# Patient Record
Sex: FEMALE | Race: WHITE | NOT HISPANIC OR LATINO | Employment: UNEMPLOYED | URBAN - NONMETROPOLITAN AREA
[De-identification: names, ages, dates, MRNs, and addresses within clinical notes are randomized per-mention and may not be internally consistent; named-entity substitution may affect disease eponyms.]

---

## 2023-03-13 DIAGNOSIS — R53.83 OTHER FATIGUE: ICD-10-CM

## 2023-03-13 RX ORDER — MAGNESIUM 64 MG (MAGNESIUM CHLORIDE) TABLET,DELAYED RELEASE
64 DAILY
Qty: 30 TABLET | Refills: 5 | Status: SHIPPED | OUTPATIENT
Start: 2023-03-13 | End: 2023-04-12

## 2023-03-13 RX ORDER — MAGNESIUM 64 MG (MAGNESIUM CHLORIDE) TABLET,DELAYED RELEASE
64 DAILY
COMMUNITY
End: 2023-03-13 | Stop reason: SDUPTHER

## 2023-03-22 DIAGNOSIS — I10 HYPERTENSION, UNSPECIFIED TYPE: Primary | ICD-10-CM

## 2023-03-22 DIAGNOSIS — Z00.00 ENCOUNTER FOR GENERAL ADULT MEDICAL EXAMINATION WITHOUT ABNORMAL FINDINGS: ICD-10-CM

## 2023-03-22 RX ORDER — METOPROLOL TARTRATE 50 MG/1
1 TABLET ORAL DAILY
COMMUNITY
End: 2023-03-22 | Stop reason: SDUPTHER

## 2023-03-23 RX ORDER — METOPROLOL TARTRATE 50 MG/1
50 TABLET ORAL DAILY
Qty: 30 TABLET | Refills: 5 | Status: SHIPPED | OUTPATIENT
Start: 2023-03-23 | End: 2023-04-13 | Stop reason: SDUPTHER

## 2023-03-23 RX ORDER — POTASSIUM CHLORIDE 750 MG/1
CAPSULE, EXTENDED RELEASE ORAL
Qty: 30 CAPSULE | Refills: 5 | Status: SHIPPED | OUTPATIENT
Start: 2023-03-23 | End: 2023-07-18 | Stop reason: SDUPTHER

## 2023-04-03 ENCOUNTER — APPOINTMENT (OUTPATIENT)
Dept: PRIMARY CARE | Facility: CLINIC | Age: 38
End: 2023-04-03
Payer: COMMERCIAL

## 2023-04-12 PROBLEM — R26.9 GAIT DISTURBANCE: Status: ACTIVE | Noted: 2023-04-12

## 2023-04-12 PROBLEM — G40.909 SEIZURE DISORDER (MULTI): Status: ACTIVE | Noted: 2023-04-12

## 2023-04-12 PROBLEM — B00.1 HERPES LABIALIS: Status: ACTIVE | Noted: 2023-04-12

## 2023-04-12 PROBLEM — J45.40 MODERATE PERSISTENT ASTHMA (HHS-HCC): Status: ACTIVE | Noted: 2023-04-12

## 2023-04-12 PROBLEM — E03.8 HYPOTHYROIDISM, SECONDARY: Status: ACTIVE | Noted: 2023-04-12

## 2023-04-12 PROBLEM — F51.01 INSOMNIA, IDIOPATHIC: Status: ACTIVE | Noted: 2023-04-12

## 2023-04-12 PROBLEM — E55.9 VITAMIN D DEFICIENCY: Status: ACTIVE | Noted: 2023-04-12

## 2023-04-12 PROBLEM — I10 HTN (HYPERTENSION), BENIGN: Status: ACTIVE | Noted: 2023-04-12

## 2023-04-12 PROBLEM — R09.82 POST-NASAL DRIP: Status: ACTIVE | Noted: 2023-04-12

## 2023-04-12 PROBLEM — I45.81 PROLONGED QT SYNDROME: Status: ACTIVE | Noted: 2023-04-12

## 2023-04-12 PROBLEM — R53.83 FATIGUE: Status: ACTIVE | Noted: 2023-04-12

## 2023-04-12 PROBLEM — M25.562 LEFT KNEE PAIN: Status: ACTIVE | Noted: 2023-04-12

## 2023-04-12 PROBLEM — G25.81 RESTLESS LEG SYNDROME: Status: ACTIVE | Noted: 2023-04-12

## 2023-04-12 PROBLEM — L30.9 DERMATITIS: Status: ACTIVE | Noted: 2023-04-12

## 2023-04-12 PROBLEM — E83.51 HYPOCALCEMIA: Status: ACTIVE | Noted: 2023-04-12

## 2023-04-12 PROBLEM — F41.9 ANXIETY: Status: ACTIVE | Noted: 2023-04-12

## 2023-04-12 PROBLEM — G47.34 NOCTURNAL HYPOXEMIA: Status: ACTIVE | Noted: 2023-04-12

## 2023-04-12 RX ORDER — FLUTICASONE PROPIONATE 50 MCG
2 SPRAY, SUSPENSION (ML) NASAL DAILY
COMMUNITY
End: 2023-05-30 | Stop reason: SDUPTHER

## 2023-04-12 RX ORDER — LEVOTHYROXINE SODIUM 300 UG/1
300 TABLET ORAL DAILY
COMMUNITY
End: 2023-05-30 | Stop reason: SDUPTHER

## 2023-04-12 RX ORDER — CLOTRIMAZOLE AND BETAMETHASONE DIPROPIONATE 10; .64 MG/G; MG/G
1 CREAM TOPICAL
COMMUNITY
End: 2023-09-29 | Stop reason: SDUPTHER

## 2023-04-12 RX ORDER — ACETAMINOPHEN 500 MG
1 TABLET ORAL DAILY
COMMUNITY
Start: 2021-08-11 | End: 2023-07-18 | Stop reason: SDUPTHER

## 2023-04-12 RX ORDER — ERGOCALCIFEROL 1.25 MG/1
1 CAPSULE ORAL 2 TIMES WEEKLY
COMMUNITY
Start: 2022-06-28 | End: 2023-04-13 | Stop reason: SDUPTHER

## 2023-04-12 RX ORDER — CALCITRIOL 0.5 UG/1
0.5 CAPSULE ORAL DAILY
COMMUNITY
End: 2023-07-18 | Stop reason: SDUPTHER

## 2023-04-13 ENCOUNTER — OFFICE VISIT (OUTPATIENT)
Dept: PRIMARY CARE | Facility: CLINIC | Age: 38
End: 2023-04-13
Payer: COMMERCIAL

## 2023-04-13 VITALS
DIASTOLIC BLOOD PRESSURE: 66 MMHG | HEART RATE: 76 BPM | SYSTOLIC BLOOD PRESSURE: 112 MMHG | HEIGHT: 61 IN | BODY MASS INDEX: 39.65 KG/M2 | WEIGHT: 210 LBS

## 2023-04-13 DIAGNOSIS — R42 DIZZINESS: Primary | ICD-10-CM

## 2023-04-13 DIAGNOSIS — E55.9 VITAMIN D DEFICIENCY: ICD-10-CM

## 2023-04-13 DIAGNOSIS — I10 HYPERTENSION, UNSPECIFIED TYPE: ICD-10-CM

## 2023-04-13 DIAGNOSIS — I45.81 PROLONGED QT SYNDROME: ICD-10-CM

## 2023-04-13 DIAGNOSIS — E66.09 CLASS 2 OBESITY DUE TO EXCESS CALORIES WITHOUT SERIOUS COMORBIDITY WITH BODY MASS INDEX (BMI) OF 39.0 TO 39.9 IN ADULT: ICD-10-CM

## 2023-04-13 DIAGNOSIS — E03.8 HYPOTHYROIDISM, SECONDARY: ICD-10-CM

## 2023-04-13 DIAGNOSIS — I10 HTN (HYPERTENSION), BENIGN: ICD-10-CM

## 2023-04-13 DIAGNOSIS — G40.909 SEIZURE DISORDER (MULTI): ICD-10-CM

## 2023-04-13 PROBLEM — E66.812 CLASS 2 OBESITY DUE TO EXCESS CALORIES WITHOUT SERIOUS COMORBIDITY WITH BODY MASS INDEX (BMI) OF 39.0 TO 39.9 IN ADULT: Status: ACTIVE | Noted: 2023-04-13

## 2023-04-13 PROCEDURE — 99214 OFFICE O/P EST MOD 30 MIN: CPT | Performed by: INTERNAL MEDICINE

## 2023-04-13 PROCEDURE — 3078F DIAST BP <80 MM HG: CPT | Performed by: INTERNAL MEDICINE

## 2023-04-13 PROCEDURE — 3074F SYST BP LT 130 MM HG: CPT | Performed by: INTERNAL MEDICINE

## 2023-04-13 PROCEDURE — 3008F BODY MASS INDEX DOCD: CPT | Performed by: INTERNAL MEDICINE

## 2023-04-13 PROCEDURE — 1036F TOBACCO NON-USER: CPT | Performed by: INTERNAL MEDICINE

## 2023-04-13 RX ORDER — METOPROLOL TARTRATE 50 MG/1
50 TABLET ORAL DAILY
Qty: 90 TABLET | Refills: 3 | Status: SHIPPED | OUTPATIENT
Start: 2023-04-13 | End: 2023-07-18 | Stop reason: ALTCHOICE

## 2023-04-13 RX ORDER — ERGOCALCIFEROL 1.25 MG/1
1 CAPSULE ORAL
Qty: 4 CAPSULE | Refills: 5 | Status: SHIPPED | OUTPATIENT
Start: 2023-04-13 | End: 2023-07-18 | Stop reason: SDUPTHER

## 2023-04-13 ASSESSMENT — ENCOUNTER SYMPTOMS
VOMITING: 0
ARTHRALGIAS: 0
ABDOMINAL PAIN: 0
SHORTNESS OF BREATH: 0
DIARRHEA: 0
WHEEZING: 0
COUGH: 0
BACK PAIN: 0
FATIGUE: 1
NAUSEA: 0
CHEST TIGHTNESS: 0
BLOOD IN STOOL: 0
PALPITATIONS: 1

## 2023-04-13 NOTE — PROGRESS NOTES
Subjective   Patient ID: Bety Ibrahim is a 38 y.o. female who presents for Follow-up (Has c/o dizziness x 3-4 days but denies any currently ).  HPI  She is here today for a checkup and explains that several days ago she was having some symptoms of dizziness which she explains feels similar to what she had experienced in the past with her heart issues.  She states it has resolved now and she is feeling okay.  She has been following with Dr. Tyler Chou at [facility] for many years.  She was diagnosed with DiGeorge syndrome and had to have an AICD placed back in June 1999.  It was removed in 2019.  She states she had a recent follow-up and things seem to be well at that time but now she is having some different symptoms.  She would like to see cardiology closer to home and I will be referring her to  cardiology.  I am also going to order a 2-week cardiac monitor.  She states she has been drinking a lot of pop and I have asked that she cut out the soda because sometimes it can drive down the sodium level and so forth.  We have decided to do some new lab work to make sure all is well since she has had a history of electrolyte disturbances and vitamin D deficiency.  We plan on seeing her back in follow-up and as always I do encourage her to call if she is not doing well and go to the emergency room if she has severe symptoms  Review of Systems   Constitutional:  Positive for fatigue.   Respiratory:  Negative for cough, chest tightness, shortness of breath and wheezing.    Cardiovascular:  Positive for palpitations. Negative for chest pain and leg swelling.   Gastrointestinal:  Negative for abdominal pain, blood in stool, diarrhea, nausea and vomiting.   Musculoskeletal:  Negative for arthralgias and back pain.     Objective   Physical Exam  Vitals and nursing note reviewed.   Constitutional:       General: She is not in acute distress.     Appearance: Normal appearance.   HENT:      Head:  Normocephalic and atraumatic.   Eyes:      Conjunctiva/sclera: Conjunctivae normal.   Cardiovascular:      Rate and Rhythm: Normal rate and regular rhythm.      Heart sounds: Normal heart sounds.   Pulmonary:      Effort: No respiratory distress.      Breath sounds: No wheezing.   Abdominal:      Palpations: Abdomen is soft.      Tenderness: There is no abdominal tenderness. There is no guarding.   Musculoskeletal:         General: No swelling. Normal range of motion.   Skin:     General: Skin is warm and dry.   Neurological:      General: No focal deficit present.      Mental Status: She is alert and oriented to person, place, and time.   Psychiatric:         Behavior: Behavior normal.       Assessment/Plan   Problem List Items Addressed This Visit          Nervous    Hypothyroidism, secondary     - We will check a TSH given current symptoms  -She will continue with levothyroxine 300 mcg daily         Relevant Orders    TSH    Seizure disorder (CMS/HCC)     -Her seizure disorder has been stable  -She does not drive as a result of her condition  -Her medication for seizure disorder was discontinued many years ago         Relevant Orders    CBC and Auto Differential       Circulatory    HTN (hypertension), benign     -Blood pressure is currently well controlled  -She will remain on metoprolol tartrate 50 mg daily         Prolonged QT syndrome     -We are  referring her to cardiology         Relevant Medications    metoprolol tartrate (Lopressor) 50 mg tablet    Other Relevant Orders    Referral to Cardiology       Endocrine/Metabolic    Vitamin D deficiency     -We will check a vitamin D level         Class 2 obesity due to excess calories without serious comorbidity with body mass index (BMI) of 39.0 to 39.9 in adult     -I am sending her for a fasting glucose level and hemoglobin A1c  -I encouraged her to eat a healthy diet and try to stay as physically active as possible  -I recommended that she start by not  drinking soda beverages and simply stick with water         Relevant Orders    Hemoglobin A1c       Other    Dizziness - Primary     -Her dizziness has resolved as of a week ago  -I am asking her to get lab work done at her earliest convenience and we will see her back to go over results         Relevant Orders    Referral to Cardiology    Holter or Event Cardiac Monitor    Comprehensive Metabolic Panel    CBC and Auto Differential     Other Visit Diagnoses       Hypertension, unspecified type        Relevant Medications    metoprolol tartrate (Lopressor) 50 mg tablet               Mary Rosenberg,

## 2023-04-13 NOTE — ASSESSMENT & PLAN NOTE
-Her dizziness has resolved as of a week ago  -I am asking her to get lab work done at her earliest convenience and we will see her back to go over results

## 2023-04-13 NOTE — PATIENT INSTRUCTIONS
Please remember to go get lab work fasting at your earliest convenience and we will go over the results when you come back  We are also going to schedule you for a cardiac monitor which we will wear for 2 weeks and then you should see the cardiologist after it is completed  I would also like to see you back after the monitor is completed

## 2023-04-13 NOTE — ASSESSMENT & PLAN NOTE
-She is being referred to  cardiology team for evaluation of her heart  -She had a cardiac defibrillator implanted in June 1999 and it was removed in 2019  -She has been following with Dr. Tyler Chou a pediatric cardiologist at [facility].  -I am ordering a 2-week cardiac monitor and we discussed symptoms for which to go to the emergency room

## 2023-04-13 NOTE — ASSESSMENT & PLAN NOTE
-I am sending her for a fasting glucose level and hemoglobin A1c  -I encouraged her to eat a healthy diet and try to stay as physically active as possible  -I recommended that she start by not drinking soda beverages and simply stick with water

## 2023-04-13 NOTE — ASSESSMENT & PLAN NOTE
-Her seizure disorder has been stable  -She does not drive as a result of her condition  -Her medication for seizure disorder was discontinued many years ago

## 2023-04-28 ENCOUNTER — LAB (OUTPATIENT)
Dept: LAB | Facility: LAB | Age: 38
End: 2023-04-28
Payer: COMMERCIAL

## 2023-04-28 DIAGNOSIS — G40.909 SEIZURE DISORDER (MULTI): ICD-10-CM

## 2023-04-28 DIAGNOSIS — R42 DIZZINESS: ICD-10-CM

## 2023-04-28 DIAGNOSIS — E03.8 HYPOTHYROIDISM, SECONDARY: ICD-10-CM

## 2023-04-28 DIAGNOSIS — E66.09 CLASS 2 OBESITY DUE TO EXCESS CALORIES WITHOUT SERIOUS COMORBIDITY WITH BODY MASS INDEX (BMI) OF 39.0 TO 39.9 IN ADULT: ICD-10-CM

## 2023-04-28 LAB
ALANINE AMINOTRANSFERASE (SGPT) (U/L) IN SER/PLAS: 24 U/L (ref 7–45)
ALBUMIN (G/DL) IN SER/PLAS: 4.3 G/DL (ref 3.4–5)
ALKALINE PHOSPHATASE (U/L) IN SER/PLAS: 63 U/L (ref 33–110)
ANION GAP IN SER/PLAS: 13 MMOL/L (ref 10–20)
ASPARTATE AMINOTRANSFERASE (SGOT) (U/L) IN SER/PLAS: 20 U/L (ref 9–39)
BASOPHILS (10*3/UL) IN BLOOD BY AUTOMATED COUNT: 0.05 X10E9/L (ref 0–0.1)
BASOPHILS/100 LEUKOCYTES IN BLOOD BY AUTOMATED COUNT: 0.5 % (ref 0–2)
BILIRUBIN TOTAL (MG/DL) IN SER/PLAS: 0.4 MG/DL (ref 0–1.2)
CALCIUM (MG/DL) IN SER/PLAS: 7.6 MG/DL (ref 8.6–10.3)
CARBON DIOXIDE, TOTAL (MMOL/L) IN SER/PLAS: 26 MMOL/L (ref 21–32)
CHLORIDE (MMOL/L) IN SER/PLAS: 101 MMOL/L (ref 98–107)
CREATININE (MG/DL) IN SER/PLAS: 0.91 MG/DL (ref 0.5–1.05)
EOSINOPHILS (10*3/UL) IN BLOOD BY AUTOMATED COUNT: 0.45 X10E9/L (ref 0–0.7)
EOSINOPHILS/100 LEUKOCYTES IN BLOOD BY AUTOMATED COUNT: 4.2 % (ref 0–6)
ERYTHROCYTE DISTRIBUTION WIDTH (RATIO) BY AUTOMATED COUNT: 13.2 % (ref 11.5–14.5)
ERYTHROCYTE MEAN CORPUSCULAR HEMOGLOBIN CONCENTRATION (G/DL) BY AUTOMATED: 32.5 G/DL (ref 32–36)
ERYTHROCYTE MEAN CORPUSCULAR VOLUME (FL) BY AUTOMATED COUNT: 88 FL (ref 80–100)
ERYTHROCYTES (10*6/UL) IN BLOOD BY AUTOMATED COUNT: 4.27 X10E12/L (ref 4–5.2)
GFR FEMALE: 82 ML/MIN/1.73M2
GLUCOSE (MG/DL) IN SER/PLAS: 79 MG/DL (ref 74–99)
HEMATOCRIT (%) IN BLOOD BY AUTOMATED COUNT: 37.5 % (ref 36–46)
HEMOGLOBIN (G/DL) IN BLOOD: 12.2 G/DL (ref 12–16)
IMMATURE GRANULOCYTES/100 LEUKOCYTES IN BLOOD BY AUTOMATED COUNT: 0.5 % (ref 0–0.9)
LEUKOCYTES (10*3/UL) IN BLOOD BY AUTOMATED COUNT: 10.7 X10E9/L (ref 4.4–11.3)
LYMPHOCYTES (10*3/UL) IN BLOOD BY AUTOMATED COUNT: 2.8 X10E9/L (ref 1.2–4.8)
LYMPHOCYTES/100 LEUKOCYTES IN BLOOD BY AUTOMATED COUNT: 26.1 % (ref 13–44)
MONOCYTES (10*3/UL) IN BLOOD BY AUTOMATED COUNT: 0.68 X10E9/L (ref 0.1–1)
MONOCYTES/100 LEUKOCYTES IN BLOOD BY AUTOMATED COUNT: 6.3 % (ref 2–10)
NEUTROPHILS (10*3/UL) IN BLOOD BY AUTOMATED COUNT: 6.71 X10E9/L (ref 1.2–7.7)
NEUTROPHILS/100 LEUKOCYTES IN BLOOD BY AUTOMATED COUNT: 62.4 % (ref 40–80)
PLATELETS (10*3/UL) IN BLOOD AUTOMATED COUNT: 214 X10E9/L (ref 150–450)
POTASSIUM (MMOL/L) IN SER/PLAS: 3.8 MMOL/L (ref 3.5–5.3)
PROTEIN TOTAL: 8 G/DL (ref 6.4–8.2)
SODIUM (MMOL/L) IN SER/PLAS: 136 MMOL/L (ref 136–145)
THYROTROPIN (MIU/L) IN SER/PLAS BY DETECTION LIMIT <= 0.05 MIU/L: 12.63 MIU/L (ref 0.44–3.98)
UREA NITROGEN (MG/DL) IN SER/PLAS: 12 MG/DL (ref 6–23)

## 2023-04-28 PROCEDURE — 83036 HEMOGLOBIN GLYCOSYLATED A1C: CPT

## 2023-04-28 PROCEDURE — 36415 COLL VENOUS BLD VENIPUNCTURE: CPT

## 2023-04-28 PROCEDURE — 80053 COMPREHEN METABOLIC PANEL: CPT

## 2023-04-28 PROCEDURE — 84443 ASSAY THYROID STIM HORMONE: CPT

## 2023-04-28 PROCEDURE — 85025 COMPLETE CBC W/AUTO DIFF WBC: CPT

## 2023-04-29 LAB
ESTIMATED AVERAGE GLUCOSE FOR HBA1C: 108 MG/DL
HEMOGLOBIN A1C/HEMOGLOBIN TOTAL IN BLOOD: 5.4 %

## 2023-05-30 ENCOUNTER — OFFICE VISIT (OUTPATIENT)
Dept: PRIMARY CARE | Facility: CLINIC | Age: 38
End: 2023-05-30
Payer: COMMERCIAL

## 2023-05-30 VITALS
HEIGHT: 61 IN | WEIGHT: 204 LBS | SYSTOLIC BLOOD PRESSURE: 120 MMHG | OXYGEN SATURATION: 96 % | HEART RATE: 74 BPM | DIASTOLIC BLOOD PRESSURE: 70 MMHG | BODY MASS INDEX: 38.51 KG/M2

## 2023-05-30 DIAGNOSIS — E03.8 HYPOTHYROIDISM, SECONDARY: ICD-10-CM

## 2023-05-30 DIAGNOSIS — I45.81 PROLONGED QT SYNDROME: ICD-10-CM

## 2023-05-30 DIAGNOSIS — R09.82 POST-NASAL DRIP: ICD-10-CM

## 2023-05-30 DIAGNOSIS — I10 HTN (HYPERTENSION), BENIGN: ICD-10-CM

## 2023-05-30 DIAGNOSIS — D82.1 DIGEORGE'S SYNDROME (MULTI): ICD-10-CM

## 2023-05-30 DIAGNOSIS — R05.1 ACUTE COUGH: Primary | ICD-10-CM

## 2023-05-30 DIAGNOSIS — I27.21 SECONDARY PULMONARY ARTERIAL HYPERTENSION (MULTI): ICD-10-CM

## 2023-05-30 PROBLEM — F81.9 LEARNING DISABILITY: Status: ACTIVE | Noted: 2021-12-10

## 2023-05-30 PROBLEM — E03.4 HYPOTHYROIDISM DUE TO ACQUIRED ATROPHY OF THYROID: Status: ACTIVE | Noted: 2021-12-10

## 2023-05-30 PROBLEM — Z95.810 CARDIAC DEFIBRILLATOR IN PLACE: Status: ACTIVE | Noted: 2023-05-30

## 2023-05-30 PROBLEM — H69.93 DYSFUNCTION OF BOTH EUSTACHIAN TUBES: Status: ACTIVE | Noted: 2023-05-30

## 2023-05-30 PROCEDURE — 99214 OFFICE O/P EST MOD 30 MIN: CPT | Performed by: INTERNAL MEDICINE

## 2023-05-30 PROCEDURE — 3074F SYST BP LT 130 MM HG: CPT | Performed by: INTERNAL MEDICINE

## 2023-05-30 PROCEDURE — 1036F TOBACCO NON-USER: CPT | Performed by: INTERNAL MEDICINE

## 2023-05-30 PROCEDURE — 3008F BODY MASS INDEX DOCD: CPT | Performed by: INTERNAL MEDICINE

## 2023-05-30 PROCEDURE — 3078F DIAST BP <80 MM HG: CPT | Performed by: INTERNAL MEDICINE

## 2023-05-30 RX ORDER — MAGNESIUM 64 MG (MAGNESIUM CHLORIDE) TABLET,DELAYED RELEASE
1 DAILY
COMMUNITY
End: 2023-09-29 | Stop reason: SDUPTHER

## 2023-05-30 RX ORDER — METOPROLOL SUCCINATE 50 MG/1
50 TABLET, EXTENDED RELEASE ORAL DAILY
Qty: 90 TABLET | Refills: 0 | Status: SHIPPED | OUTPATIENT
Start: 2023-05-30 | End: 2023-07-18 | Stop reason: SDUPTHER

## 2023-05-30 RX ORDER — METOPROLOL SUCCINATE 50 MG/1
50 TABLET, EXTENDED RELEASE ORAL DAILY
Qty: 30 TABLET | Refills: 5 | Status: SHIPPED | OUTPATIENT
Start: 2023-05-30 | End: 2023-05-30 | Stop reason: SDUPTHER

## 2023-05-30 RX ORDER — LEVOTHYROXINE SODIUM 300 UG/1
TABLET ORAL
Qty: 90 TABLET | Refills: 1 | Status: SHIPPED | OUTPATIENT
Start: 2023-05-30 | End: 2023-07-18 | Stop reason: SDUPTHER

## 2023-05-30 RX ORDER — FLUTICASONE PROPIONATE 50 MCG
2 SPRAY, SUSPENSION (ML) NASAL DAILY
Qty: 16 G | Refills: 3 | Status: SHIPPED | OUTPATIENT
Start: 2023-05-30 | End: 2023-09-29 | Stop reason: SDUPTHER

## 2023-05-30 RX ORDER — BENZONATATE 100 MG/1
100 CAPSULE ORAL 3 TIMES DAILY PRN
Qty: 42 CAPSULE | Refills: 0 | Status: SHIPPED | OUTPATIENT
Start: 2023-05-30 | End: 2023-06-29

## 2023-05-30 RX ORDER — LEVOTHYROXINE SODIUM 75 UG/1
TABLET ORAL
Qty: 90 TABLET | Refills: 1 | Status: SHIPPED | OUTPATIENT
Start: 2023-05-30 | End: 2023-07-18 | Stop reason: SDUPTHER

## 2023-05-30 ASSESSMENT — ENCOUNTER SYMPTOMS
COUGH: 1
BLOOD IN STOOL: 0
ABDOMINAL PAIN: 0
NAUSEA: 0
WHEEZING: 0
VOMITING: 0
PALPITATIONS: 0
FATIGUE: 1
CHEST TIGHTNESS: 0
SHORTNESS OF BREATH: 0
DIARRHEA: 0
BACK PAIN: 0
ARTHRALGIAS: 0

## 2023-05-30 NOTE — PATIENT INSTRUCTIONS
As we discussed your thyroid blood test results are improving after you been taking your levothyroxine more consistently every day.  As you are aware you are on the highest dose of levothyroxine which is 300 mcg.  I am going to add a second tablet to the 300 mcg.  Basically you will get a 300 mcg dose and a 75 mcg dose that you will take together each day.  That means you will be taking 375 mcg every day.  Please remember to take it consistently every day and in isolation from food, other medications, or vitamins.  In approximately 2 months from now you will go to the lab for a TSH and then I will see back to go over the results  I went ahead and refilled your metoprolol but you will see that you are getting metoprolol succinate which is an extended release preparation.  You will still be on 50 mg and you will take it each day in the morning once daily.  Please discard your other metoprolol medication and please call with any problems or concerns  Be sure and discussed your metoprolol dose with the cardiologist when you see them next time  I went ahead and refilled your Flonase nasal spray and also I sent medication for cough called Tessalon.  This medication is to be swallowed whole with water and not to don or sucked on.  Please continue following with endocrinology  Again I want to see you back in 2 months but please call with any questions or concerns sooner

## 2023-05-30 NOTE — ASSESSMENT & PLAN NOTE
-Blood pressure is currently well controlled.  -She went on to explain that she has been taking her metoprolol 50 mg every day for several years and she forgot to ask for refill when she saw the cardiologist recently.  Oddly she has been getting metoprolol tartrate and only taking 50 mg once a day.  I told her that this medication is typically dosed twice a day because of its half-life.  I am switching her to metoprolol succinate XL 50 mg once a day.  -She will call with any problems regarding this medication

## 2023-05-30 NOTE — ASSESSMENT & PLAN NOTE
-She has been referred to an EP specialist in the  system  -She had a recent cardiac monitor and I could not find the results at this time

## 2023-05-30 NOTE — ASSESSMENT & PLAN NOTE
-TSH came back at 12.63.  -She has required unusually high doses of levothyroxine for supplementation.  She has been on 300 mcg for couple of months.  I am adding 75 mcg to her regimen and she has agreed to go to the lab in approximately 2 months for another TSH.  I will see her back at that time to make sure we are on track.

## 2023-05-30 NOTE — PROGRESS NOTES
Subjective   Patient ID: Bety Ibrahim is a 38 y.o. female who presents for No chief complaint on file..  HPI  She is here today for follow-up and since her last visit she saw Dr. Stout on May 22.  He reviewed her medical history and cardiac condition and has referred her to an electrophysiologist.  She also completed a cardiac monitor for a few days shy of 2 weeks because she states she ran out of the sticky pads for her chest.  I did not see the results of that monitor yet.  We did conduct a review of systems and she states that overall she has been feeling okay.  She has however had some symptoms and postnasal drip with cough and it appears that it is likely allergy.  Giving her a refill on her Flonase and also Tessalon for her cough.  She will call if her condition is worsening as opposed to getting better.  We also reviewed her most recent laboratory test results and her TSH is improving from last time.  It appears that she still needs additional thyroid supplementation.  She reports that she takes a 300 mcg consistently and has not missed any doses.  We will add 75 to her regimen and then recheck a TSH again in 2 months.  She also states that she ran out of her metoprolol and she has been taking 50 mg once a day for many years.  I did agree to give her a short-term follow-up prescription and then we talked about making sure she checks with cardiology next time regarding continuation of this medicine.  We also reviewed her other laboratory test results and for the most part and pleased with all of her numbers.  I do want her to follow-up with her endocrinologist however because of persistent low calcium levels and so forth.  We did conduct a review of systems and I will summarize my assessment and plan in a problem based format  Review of Systems   Constitutional:  Positive for fatigue.   HENT:  Positive for congestion and postnasal drip.    Respiratory:  Positive for cough. Negative for chest tightness,  shortness of breath and wheezing.    Cardiovascular:  Negative for chest pain, palpitations and leg swelling.   Gastrointestinal:  Negative for abdominal pain, blood in stool, diarrhea, nausea and vomiting.   Musculoskeletal:  Negative for arthralgias and back pain.     Objective   Physical Exam  Vitals and nursing note reviewed.   Constitutional:       General: She is not in acute distress.     Appearance: Normal appearance.   HENT:      Head: Normocephalic and atraumatic.   Eyes:      Conjunctiva/sclera: Conjunctivae normal.   Cardiovascular:      Rate and Rhythm: Normal rate and regular rhythm.      Heart sounds: Normal heart sounds.   Pulmonary:      Effort: No respiratory distress.      Breath sounds: No wheezing.   Abdominal:      Palpations: Abdomen is soft.      Tenderness: There is no abdominal tenderness. There is no guarding.   Musculoskeletal:         General: No swelling. Normal range of motion.   Skin:     General: Skin is warm and dry.   Neurological:      General: No focal deficit present.      Mental Status: She is alert and oriented to person, place, and time.   Psychiatric:         Behavior: Behavior normal.       Recent Results (from the past 1344 hour(s))   Hemoglobin A1c    Collection Time: 04/28/23  2:20 PM   Result Value Ref Range    Hemoglobin A1C 5.4 %    Estimated Average Glucose 108 MG/DL   CBC and Auto Differential    Collection Time: 04/28/23  2:20 PM   Result Value Ref Range    WBC 10.7 4.4 - 11.3 x10E9/L    RBC 4.27 4.00 - 5.20 x10E12/L    Hemoglobin 12.2 12.0 - 16.0 g/dL    Hematocrit 37.5 36.0 - 46.0 %    MCV 88 80 - 100 fL    MCHC 32.5 32.0 - 36.0 g/dL    Platelets 214 150 - 450 x10E9/L    RDW 13.2 11.5 - 14.5 %    Neutrophils % 62.4 40.0 - 80.0 %    Immature Granulocytes %, Automated 0.5 0.0 - 0.9 %    Lymphocytes % 26.1 13.0 - 44.0 %    Monocytes % 6.3 2.0 - 10.0 %    Eosinophils % 4.2 0.0 - 6.0 %    Basophils % 0.5 0.0 - 2.0 %    Neutrophils Absolute 6.71 1.20 - 7.70 x10E9/L     Lymphocytes Absolute 2.80 1.20 - 4.80 x10E9/L    Monocytes Absolute 0.68 0.10 - 1.00 x10E9/L    Eosinophils Absolute 0.45 0.00 - 0.70 x10E9/L    Basophils Absolute 0.05 0.00 - 0.10 x10E9/L   Comprehensive Metabolic Panel    Collection Time: 04/28/23  2:20 PM   Result Value Ref Range    Glucose 79 74 - 99 mg/dL    Sodium 136 136 - 145 mmol/L    Potassium 3.8 3.5 - 5.3 mmol/L    Chloride 101 98 - 107 mmol/L    Bicarbonate 26 21 - 32 mmol/L    Anion Gap 13 10 - 20 mmol/L    Urea Nitrogen 12 6 - 23 mg/dL    Creatinine 0.91 0.50 - 1.05 mg/dL    GFR Female 82 >90 mL/min/1.73m2    Calcium 7.6 (L) 8.6 - 10.3 mg/dL    Albumin 4.3 3.4 - 5.0 g/dL    Alkaline Phosphatase 63 33 - 110 U/L    Total Protein 8.0 6.4 - 8.2 g/dL    AST 20 9 - 39 U/L    Total Bilirubin 0.4 0.0 - 1.2 mg/dL    ALT (SGPT) 24 7 - 45 U/L   TSH    Collection Time: 04/28/23  2:20 PM   Result Value Ref Range    TSH 12.63 (H) 0.44 - 3.98 mIU/L       Assessment/Plan   Problem List Items Addressed This Visit          Nervous    Hypothyroidism, secondary     -TSH came back at 12.63.  -She has required unusually high doses of levothyroxine for supplementation.  She has been on 300 mcg for couple of months.  I am adding 75 mcg to her regimen and she has agreed to go to the lab in approximately 2 months for another TSH.  I will see her back at that time to make sure we are on track.         Relevant Medications    levothyroxine (Synthroid, Unithroid) 300 mcg tablet    levothyroxine (Synthroid, Levoxyl) 75 mcg tablet    Other Relevant Orders    TSH    Follow Up In Primary Care       Circulatory    HTN (hypertension), benign     -Blood pressure is currently well controlled.  -She went on to explain that she has been taking her metoprolol 50 mg every day for several years and she forgot to ask for refill when she saw the cardiologist recently.  Oddly she has been getting metoprolol tartrate and only taking 50 mg once a day.  I told her that this medication is typically  dosed twice a day because of its half-life.  I am switching her to metoprolol succinate XL 50 mg once a day.  -She will call with any problems regarding this medication         Relevant Medications    metoprolol succinate XL (Toprol-XL) 50 mg 24 hr tablet    Prolonged QT syndrome     -She has been referred to an EP specialist in the  system  -She had a recent cardiac monitor and I could not find the results at this time         Relevant Medications    metoprolol succinate XL (Toprol-XL) 50 mg 24 hr tablet    Secondary pulmonary arterial hypertension (CMS/HCC)     -She is following with cardiology            Other    Post-nasal drip     -I am providing a refill on her Flonase nasal spray for her postnasal drip and I also agreed to give her some Tessalon for her cough         Relevant Medications    fluticasone (Flonase) 50 mcg/actuation nasal spray    DiGeorge's syndrome (CMS/HCC)     -Stable  -She will continue with cardiology          Other Visit Diagnoses       Acute cough    -  Primary    Relevant Medications    benzonatate (Tessalon) 100 mg capsule               Mary Rosenberg,

## 2023-05-30 NOTE — ASSESSMENT & PLAN NOTE
-I am providing a refill on her Flonase nasal spray for her postnasal drip and I also agreed to give her some Tessalon for her cough

## 2023-07-18 ENCOUNTER — OFFICE VISIT (OUTPATIENT)
Dept: PRIMARY CARE | Facility: CLINIC | Age: 38
End: 2023-07-18
Payer: COMMERCIAL

## 2023-07-18 VITALS
HEART RATE: 97 BPM | WEIGHT: 216 LBS | BODY MASS INDEX: 40.78 KG/M2 | HEIGHT: 61 IN | OXYGEN SATURATION: 97 % | SYSTOLIC BLOOD PRESSURE: 126 MMHG | DIASTOLIC BLOOD PRESSURE: 70 MMHG

## 2023-07-18 DIAGNOSIS — E03.8 HYPOTHYROIDISM, SECONDARY: ICD-10-CM

## 2023-07-18 DIAGNOSIS — E55.9 VITAMIN D DEFICIENCY: ICD-10-CM

## 2023-07-18 DIAGNOSIS — I10 HTN (HYPERTENSION), BENIGN: ICD-10-CM

## 2023-07-18 DIAGNOSIS — M20.001 FINGER DEFORMITY, RIGHT: Primary | ICD-10-CM

## 2023-07-18 DIAGNOSIS — Z00.00 ENCOUNTER FOR GENERAL ADULT MEDICAL EXAMINATION WITHOUT ABNORMAL FINDINGS: ICD-10-CM

## 2023-07-18 PROCEDURE — 99214 OFFICE O/P EST MOD 30 MIN: CPT | Performed by: INTERNAL MEDICINE

## 2023-07-18 PROCEDURE — 3074F SYST BP LT 130 MM HG: CPT | Performed by: INTERNAL MEDICINE

## 2023-07-18 PROCEDURE — 1036F TOBACCO NON-USER: CPT | Performed by: INTERNAL MEDICINE

## 2023-07-18 PROCEDURE — 3078F DIAST BP <80 MM HG: CPT | Performed by: INTERNAL MEDICINE

## 2023-07-18 PROCEDURE — 3008F BODY MASS INDEX DOCD: CPT | Performed by: INTERNAL MEDICINE

## 2023-07-18 RX ORDER — ACETAMINOPHEN 500 MG
1 TABLET ORAL DAILY
Qty: 90 TABLET | Refills: 1 | Status: SHIPPED | OUTPATIENT
Start: 2023-07-18 | End: 2023-09-29 | Stop reason: SDUPTHER

## 2023-07-18 RX ORDER — LEVOTHYROXINE SODIUM 75 UG/1
TABLET ORAL
Qty: 90 TABLET | Refills: 1 | Status: SHIPPED | OUTPATIENT
Start: 2023-07-18 | End: 2024-01-10 | Stop reason: SDUPTHER

## 2023-07-18 RX ORDER — ERGOCALCIFEROL 1.25 MG/1
1 CAPSULE ORAL
Qty: 4 CAPSULE | Refills: 5 | Status: SHIPPED | OUTPATIENT
Start: 2023-07-18 | End: 2023-09-29 | Stop reason: SDUPTHER

## 2023-07-18 RX ORDER — METOPROLOL SUCCINATE 50 MG/1
50 TABLET, EXTENDED RELEASE ORAL DAILY
Qty: 90 TABLET | Refills: 1 | Status: SHIPPED | OUTPATIENT
Start: 2023-07-18 | End: 2023-09-29 | Stop reason: SDUPTHER

## 2023-07-18 RX ORDER — LEVOTHYROXINE SODIUM 300 UG/1
TABLET ORAL
Qty: 90 TABLET | Refills: 1 | Status: SHIPPED | OUTPATIENT
Start: 2023-07-18 | End: 2024-01-10 | Stop reason: SDUPTHER

## 2023-07-18 RX ORDER — CHOLECALCIFEROL (VITAMIN D3) 50 MCG
2000 TABLET ORAL DAILY
COMMUNITY
Start: 2023-06-11 | End: 2023-07-18 | Stop reason: ALTCHOICE

## 2023-07-18 RX ORDER — POTASSIUM CHLORIDE 750 MG/1
10 CAPSULE, EXTENDED RELEASE ORAL DAILY
Qty: 90 CAPSULE | Refills: 1 | Status: SHIPPED | OUTPATIENT
Start: 2023-07-18 | End: 2023-09-29 | Stop reason: SDUPTHER

## 2023-07-18 RX ORDER — CALCITRIOL 0.5 UG/1
0.5 CAPSULE ORAL DAILY
Qty: 30 CAPSULE | Refills: 5 | Status: SHIPPED | OUTPATIENT
Start: 2023-07-18 | End: 2023-09-29 | Stop reason: SDUPTHER

## 2023-07-18 NOTE — ASSESSMENT & PLAN NOTE
-She will go for a TSH ASAP and have agreed to call her with results  -It is that time that we will decide if we need to make an adjustment in her levothyroxine dose  -She is currently taking the equivalent of 375 mcg a day

## 2023-07-18 NOTE — PROGRESS NOTES
Subjective   Patient ID: Bety Ibrahim is a 38 y.o. female who presents for No chief complaint on file..  HPI  She is here today for follow-up.  We had intended for her to get a thyroid blood test prior to today's visit but she forgot.  She has promised to get it later this week and when it comes back we will call her with results.  We have been adjusting her thyroid medication and wanting to get her thyroid blood work within acceptable range.  She also points to a bump on the left middle finger distally and medially.  She states has been there for probably couple of months.  It is slightly painful.  We will do an x-ray and I have agreed to call her with results.  It does not look like there is an infection but rather may be a reaction to some chronic irritation.  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  Overall she is doing well with her blood pressure as we had made changes in her metoprolol and she is doing well overall with the medicine.  Review of Systems  Objective   Physical Exam  Vitals and nursing note reviewed.   Constitutional:       General: She is not in acute distress.     Appearance: Normal appearance.   HENT:      Head: Normocephalic and atraumatic.   Eyes:      Conjunctiva/sclera: Conjunctivae normal.   Cardiovascular:      Rate and Rhythm: Normal rate and regular rhythm.      Heart sounds: Normal heart sounds.   Pulmonary:      Effort: No respiratory distress.      Breath sounds: No wheezing.   Abdominal:      Palpations: Abdomen is soft.      Tenderness: There is no abdominal tenderness. There is no guarding.   Musculoskeletal:         General: No swelling. Normal range of motion.   Skin:     General: Skin is warm and dry.   Neurological:      General: No focal deficit present.      Mental Status: She is alert and oriented to person, place, and time.   Psychiatric:         Behavior: Behavior normal.         Assessment/Plan   Problem List  Items Addressed This Visit       HTN (hypertension), benign     -Currently well controlled  -She will continue with metoprolol succinate XL 50 mg daily         Relevant Medications    metoprolol succinate XL (Toprol-XL) 50 mg 24 hr tablet    Hypothyroidism, secondary     -She will go for a TSH ASAP and have agreed to call her with results  -It is that time that we will decide if we need to make an adjustment in her levothyroxine dose  -She is currently taking the equivalent of 375 mcg a day         Relevant Medications    levothyroxine (Synthroid, Levoxyl) 75 mcg tablet    levothyroxine (Synthroid, Unithroid) 300 mcg tablet    Vitamin D deficiency     -We will be giving her a vitamin D refill today         Relevant Medications    calcitriol (Rocaltrol) 0.5 mcg capsule    ergocalciferol (Vitamin D-2) 1.25 MG (07835 UT) capsule    calcium carbonate-vitamin D3 600 mg-20 mcg (800 unit) tablet    Finger deformity, right - Primary     -She will go for an x-ray and have agreed to call her with results         Relevant Orders    XR hand right 1-2 views     Other Visit Diagnoses       Encounter for general adult medical examination without abnormal findings        Relevant Medications    potassium chloride ER (Micro-K) 10 mEq ER capsule               Mary Rosenberg,

## 2023-07-18 NOTE — PATIENT INSTRUCTIONS
Please remember to go get your thyroid blood test ASAP as we are assessing whether you are on the correct amount of thyroid supplementation.  I also ordered an x-ray of your finger so please go to the radiology unit ASAP and once the results come back we will call you.  After we know your lab test results we can then decide when we need to see you back next time  [facility] address is [address] [place] [address]

## 2023-07-21 ENCOUNTER — LAB (OUTPATIENT)
Dept: LAB | Facility: LAB | Age: 38
End: 2023-07-21
Payer: COMMERCIAL

## 2023-07-21 DIAGNOSIS — E03.8 HYPOTHYROIDISM, SECONDARY: ICD-10-CM

## 2023-07-21 LAB — THYROTROPIN (MIU/L) IN SER/PLAS BY DETECTION LIMIT <= 0.05 MIU/L: 29.82 MIU/L (ref 0.44–3.98)

## 2023-07-21 PROCEDURE — 84443 ASSAY THYROID STIM HORMONE: CPT

## 2023-07-21 PROCEDURE — 36415 COLL VENOUS BLD VENIPUNCTURE: CPT

## 2023-07-24 ENCOUNTER — TELEPHONE (OUTPATIENT)
Dept: PRIMARY CARE | Facility: CLINIC | Age: 38
End: 2023-07-24
Payer: COMMERCIAL

## 2023-07-24 NOTE — TELEPHONE ENCOUNTER
I called her for a second time today at 5:28 PM and I got a voicemail and left a message that I was attempting to reach her

## 2023-09-29 DIAGNOSIS — Z00.00 ENCOUNTER FOR GENERAL ADULT MEDICAL EXAMINATION WITHOUT ABNORMAL FINDINGS: ICD-10-CM

## 2023-09-29 DIAGNOSIS — L30.9 DERMATITIS: Primary | ICD-10-CM

## 2023-09-29 DIAGNOSIS — I10 HTN (HYPERTENSION), BENIGN: ICD-10-CM

## 2023-09-29 DIAGNOSIS — R09.82 POST-NASAL DRIP: ICD-10-CM

## 2023-09-29 DIAGNOSIS — E61.2 MAGNESIUM DEFICIENCY: ICD-10-CM

## 2023-09-29 DIAGNOSIS — E55.9 VITAMIN D DEFICIENCY: ICD-10-CM

## 2023-09-29 RX ORDER — ACETAMINOPHEN 500 MG
1 TABLET ORAL DAILY
Qty: 90 TABLET | Refills: 1 | Status: SHIPPED | OUTPATIENT
Start: 2023-09-29

## 2023-09-29 RX ORDER — CALCITRIOL 0.5 UG/1
0.5 CAPSULE ORAL DAILY
Qty: 30 CAPSULE | Refills: 5 | Status: SHIPPED | OUTPATIENT
Start: 2023-09-29

## 2023-09-29 RX ORDER — MAGNESIUM 64 MG (MAGNESIUM CHLORIDE) TABLET,DELAYED RELEASE
64 DAILY
Qty: 30 TABLET | Refills: 5 | Status: SHIPPED | OUTPATIENT
Start: 2023-09-29

## 2023-09-29 RX ORDER — METOPROLOL SUCCINATE 50 MG/1
50 TABLET, EXTENDED RELEASE ORAL DAILY
Qty: 90 TABLET | Refills: 1 | Status: SHIPPED | OUTPATIENT
Start: 2023-09-29 | End: 2024-01-10 | Stop reason: SDUPTHER

## 2023-09-29 RX ORDER — FLUTICASONE PROPIONATE 50 MCG
2 SPRAY, SUSPENSION (ML) NASAL DAILY
Qty: 16 G | Refills: 3 | Status: SHIPPED | OUTPATIENT
Start: 2023-09-29 | End: 2023-11-01

## 2023-09-29 RX ORDER — POTASSIUM CHLORIDE 750 MG/1
10 CAPSULE, EXTENDED RELEASE ORAL DAILY
Qty: 90 CAPSULE | Refills: 1 | Status: SHIPPED | OUTPATIENT
Start: 2023-09-29 | End: 2024-01-10 | Stop reason: SDUPTHER

## 2023-09-29 RX ORDER — CLOTRIMAZOLE AND BETAMETHASONE DIPROPIONATE 10; .64 MG/G; MG/G
1 CREAM TOPICAL 2 TIMES DAILY
Qty: 15 G | Refills: 5 | Status: SHIPPED | OUTPATIENT
Start: 2023-09-29

## 2023-09-29 RX ORDER — ERGOCALCIFEROL 1.25 MG/1
1 CAPSULE ORAL
Qty: 4 CAPSULE | Refills: 5 | Status: SHIPPED | OUTPATIENT
Start: 2023-09-29 | End: 2024-01-10 | Stop reason: SDUPTHER

## 2023-10-31 DIAGNOSIS — R09.82 POST-NASAL DRIP: ICD-10-CM

## 2023-11-01 RX ORDER — FLUTICASONE PROPIONATE 50 MCG
2 SPRAY, SUSPENSION (ML) NASAL DAILY
Qty: 48 ML | Refills: 1 | Status: SHIPPED | OUTPATIENT
Start: 2023-11-01 | End: 2024-01-10 | Stop reason: SDUPTHER

## 2023-12-06 ENCOUNTER — HOSPITAL ENCOUNTER (EMERGENCY)
Facility: HOSPITAL | Age: 38
Discharge: HOME | End: 2023-12-06
Attending: EMERGENCY MEDICINE
Payer: MEDICARE

## 2023-12-06 ENCOUNTER — APPOINTMENT (OUTPATIENT)
Dept: RADIOLOGY | Facility: HOSPITAL | Age: 38
End: 2023-12-06
Payer: MEDICARE

## 2023-12-06 VITALS
DIASTOLIC BLOOD PRESSURE: 71 MMHG | SYSTOLIC BLOOD PRESSURE: 116 MMHG | WEIGHT: 211 LBS | BODY MASS INDEX: 39.84 KG/M2 | OXYGEN SATURATION: 94 % | RESPIRATION RATE: 17 BRPM | HEIGHT: 61 IN | TEMPERATURE: 97.7 F | HEART RATE: 82 BPM

## 2023-12-06 DIAGNOSIS — R20.2 PARESTHESIA: ICD-10-CM

## 2023-12-06 DIAGNOSIS — M48.02 FORAMINAL STENOSIS OF CERVICAL REGION: Primary | ICD-10-CM

## 2023-12-06 LAB
ALBUMIN SERPL BCP-MCNC: 4.5 G/DL (ref 3.4–5)
ALP SERPL-CCNC: 68 U/L (ref 33–110)
ALT SERPL W P-5'-P-CCNC: 26 U/L (ref 7–45)
ANION GAP SERPL CALC-SCNC: 14 MMOL/L (ref 10–20)
AST SERPL W P-5'-P-CCNC: 21 U/L (ref 9–39)
BASOPHILS # BLD AUTO: 0.05 X10*3/UL (ref 0–0.1)
BASOPHILS NFR BLD AUTO: 0.4 %
BILIRUB SERPL-MCNC: 0.5 MG/DL (ref 0–1.2)
BUN SERPL-MCNC: 15 MG/DL (ref 6–23)
CALCIUM SERPL-MCNC: 8.9 MG/DL (ref 8.6–10.3)
CHLORIDE SERPL-SCNC: 98 MMOL/L (ref 98–107)
CO2 SERPL-SCNC: 27 MMOL/L (ref 21–32)
CREAT SERPL-MCNC: 1 MG/DL (ref 0.5–1.05)
EOSINOPHIL # BLD AUTO: 0.46 X10*3/UL (ref 0–0.7)
EOSINOPHIL NFR BLD AUTO: 4.1 %
ERYTHROCYTE [DISTWIDTH] IN BLOOD BY AUTOMATED COUNT: 13.2 % (ref 11.5–14.5)
GFR SERPL CREATININE-BSD FRML MDRD: 73 ML/MIN/1.73M*2
GLUCOSE SERPL-MCNC: 92 MG/DL (ref 74–99)
HCT VFR BLD AUTO: 40.3 % (ref 36–46)
HGB BLD-MCNC: 12.9 G/DL (ref 12–16)
IMM GRANULOCYTES # BLD AUTO: 0.04 X10*3/UL (ref 0–0.7)
IMM GRANULOCYTES NFR BLD AUTO: 0.4 % (ref 0–0.9)
LYMPHOCYTES # BLD AUTO: 3.38 X10*3/UL (ref 1.2–4.8)
LYMPHOCYTES NFR BLD AUTO: 30.2 %
MCH RBC QN AUTO: 28.7 PG (ref 26–34)
MCHC RBC AUTO-ENTMCNC: 32 G/DL (ref 32–36)
MCV RBC AUTO: 90 FL (ref 80–100)
MONOCYTES # BLD AUTO: 0.55 X10*3/UL (ref 0.1–1)
MONOCYTES NFR BLD AUTO: 4.9 %
NEUTROPHILS # BLD AUTO: 6.7 X10*3/UL (ref 1.2–7.7)
NEUTROPHILS NFR BLD AUTO: 60 %
NRBC BLD-RTO: 0 /100 WBCS (ref 0–0)
PLATELET # BLD AUTO: 234 X10*3/UL (ref 150–450)
POTASSIUM SERPL-SCNC: 4.4 MMOL/L (ref 3.5–5.3)
PROT SERPL-MCNC: 8.6 G/DL (ref 6.4–8.2)
RBC # BLD AUTO: 4.5 X10*6/UL (ref 4–5.2)
SODIUM SERPL-SCNC: 135 MMOL/L (ref 136–145)
WBC # BLD AUTO: 11.2 X10*3/UL (ref 4.4–11.3)

## 2023-12-06 PROCEDURE — 72125 CT NECK SPINE W/O DYE: CPT

## 2023-12-06 PROCEDURE — 93005 ELECTROCARDIOGRAM TRACING: CPT

## 2023-12-06 PROCEDURE — 80053 COMPREHEN METABOLIC PANEL: CPT | Performed by: EMERGENCY MEDICINE

## 2023-12-06 PROCEDURE — 85025 COMPLETE CBC W/AUTO DIFF WBC: CPT | Performed by: EMERGENCY MEDICINE

## 2023-12-06 PROCEDURE — 70450 CT HEAD/BRAIN W/O DYE: CPT

## 2023-12-06 PROCEDURE — 70450 CT HEAD/BRAIN W/O DYE: CPT | Performed by: RADIOLOGY

## 2023-12-06 PROCEDURE — 96360 HYDRATION IV INFUSION INIT: CPT

## 2023-12-06 PROCEDURE — 72125 CT NECK SPINE W/O DYE: CPT | Performed by: RADIOLOGY

## 2023-12-06 PROCEDURE — 99285 EMERGENCY DEPT VISIT HI MDM: CPT | Performed by: EMERGENCY MEDICINE

## 2023-12-06 PROCEDURE — 36415 COLL VENOUS BLD VENIPUNCTURE: CPT | Performed by: EMERGENCY MEDICINE

## 2023-12-06 PROCEDURE — 2500000004 HC RX 250 GENERAL PHARMACY W/ HCPCS (ALT 636 FOR OP/ED): Performed by: EMERGENCY MEDICINE

## 2023-12-06 RX ORDER — PREDNISONE 20 MG/1
20 TABLET ORAL DAILY
Qty: 9 TABLET | Refills: 0 | Status: SHIPPED | OUTPATIENT
Start: 2023-12-06 | End: 2023-12-15

## 2023-12-06 RX ORDER — SODIUM CHLORIDE 9 MG/ML
125 INJECTION, SOLUTION INTRAVENOUS CONTINUOUS
Status: DISCONTINUED | OUTPATIENT
Start: 2023-12-06 | End: 2023-12-06 | Stop reason: HOSPADM

## 2023-12-06 RX ADMIN — SODIUM CHLORIDE 125 ML/HR: 9 INJECTION, SOLUTION INTRAVENOUS at 13:15

## 2023-12-06 ASSESSMENT — ENCOUNTER SYMPTOMS
VOMITING: 0
PHOTOPHOBIA: 0
BACK PAIN: 0
FEVER: 0
ARTHRALGIAS: 0
COUGH: 0
CHILLS: 0
SINUS PAIN: 0
CONFUSION: 0
SORE THROAT: 0
CHEST TIGHTNESS: 0
BRUISES/BLEEDS EASILY: 0
ABDOMINAL PAIN: 0
PALPITATIONS: 0
DYSURIA: 0
DIZZINESS: 0
HEADACHES: 0
SINUS PRESSURE: 0
SHORTNESS OF BREATH: 0
NAUSEA: 0

## 2023-12-06 ASSESSMENT — PAIN SCALES - GENERAL: PAINLEVEL_OUTOF10: 5 - MODERATE PAIN

## 2023-12-06 ASSESSMENT — PAIN - FUNCTIONAL ASSESSMENT: PAIN_FUNCTIONAL_ASSESSMENT: 0-10

## 2023-12-06 ASSESSMENT — COLUMBIA-SUICIDE SEVERITY RATING SCALE - C-SSRS
2. HAVE YOU ACTUALLY HAD ANY THOUGHTS OF KILLING YOURSELF?: NO
1. IN THE PAST MONTH, HAVE YOU WISHED YOU WERE DEAD OR WISHED YOU COULD GO TO SLEEP AND NOT WAKE UP?: NO
6. HAVE YOU EVER DONE ANYTHING, STARTED TO DO ANYTHING, OR PREPARED TO DO ANYTHING TO END YOUR LIFE?: NO

## 2023-12-06 ASSESSMENT — PAIN DESCRIPTION - LOCATION: LOCATION: NECK

## 2023-12-06 NOTE — ED PROVIDER NOTES
Chief Complaint: NUMBNESS TO SCALP    This is a 38-year-old female who complains of numbness to the posterior nape of her neck and occipital area of her scalp she is had this for 1 to 2 days intermittently she notes some tingling sensation to the upper forehead she denies any headache no dizziness no chest pains no palpitations no numbness to her arms or legs no injury presents now for evaluation           Review of Systems   Constitutional:  Negative for chills and fever.   HENT:  Negative for congestion, sinus pressure, sinus pain and sore throat.    Eyes:  Negative for photophobia and visual disturbance.   Respiratory:  Negative for cough, chest tightness and shortness of breath.    Cardiovascular:  Negative for chest pain, palpitations and leg swelling.   Gastrointestinal:  Negative for abdominal pain, nausea and vomiting.   Endocrine: Negative for heat intolerance and polyuria.   Genitourinary:  Negative for dyspareunia, dysuria, pelvic pain and urgency.   Musculoskeletal:  Negative for arthralgias and back pain.   Skin:  Negative for rash.   Neurological:  Negative for dizziness and headaches.   Hematological:  Does not bruise/bleed easily.   Psychiatric/Behavioral:  Negative for confusion.         Physical Exam  Constitutional:       General: She is not in acute distress.     Appearance: She is obese. She is ill-appearing.   HENT:      Head: Normocephalic and atraumatic.      Right Ear: Tympanic membrane normal.      Left Ear: Tympanic membrane normal.      Nose: Nose normal.      Mouth/Throat:      Mouth: Mucous membranes are dry.      Pharynx: Oropharynx is clear.   Eyes:      Extraocular Movements: Extraocular movements intact.      Conjunctiva/sclera: Conjunctivae normal.      Pupils: Pupils are equal, round, and reactive to light.   Cardiovascular:      Rate and Rhythm: Normal rate.      Pulses: Normal pulses.      Heart sounds: No murmur heard.  Pulmonary:      Effort: Pulmonary effort is normal. No  respiratory distress.      Breath sounds: Normal breath sounds. No stridor.   Abdominal:      General: There is no distension.      Tenderness: There is no abdominal tenderness.   Musculoskeletal:         General: No swelling, tenderness or signs of injury. Normal range of motion.      Cervical back: Normal range of motion. No rigidity.   Skin:     General: Skin is warm and dry.      Capillary Refill: Capillary refill takes less than 2 seconds.      Findings: No rash.   Neurological:      General: No focal deficit present.      Mental Status: She is alert and oriented to person, place, and time. Mental status is at baseline.      Cranial Nerves: No cranial nerve deficit.      Sensory: Sensory deficit present.      Motor: No weakness.      Comments: Subjective paresthesias of the scalp but no other neurologic findings   Psychiatric:         Mood and Affect: Mood normal.         Behavior: Behavior normal.          Labs Reviewed   COMPREHENSIVE METABOLIC PANEL - Abnormal       Result Value    Glucose 92      Sodium 135 (*)     Potassium 4.4      Chloride 98      Bicarbonate 27      Anion Gap 14      Urea Nitrogen 15      Creatinine 1.00      eGFR 73      Calcium 8.9      Albumin 4.5      Alkaline Phosphatase 68      Total Protein 8.6 (*)     AST 21      Bilirubin, Total 0.5      ALT 26     CBC WITH AUTO DIFFERENTIAL    WBC 11.2      nRBC 0.0      RBC 4.50      Hemoglobin 12.9      Hematocrit 40.3      MCV 90      MCH 28.7      MCHC 32.0      RDW 13.2      Platelets 234      Neutrophils % 60.0      Immature Granulocytes %, Automated 0.4      Lymphocytes % 30.2      Monocytes % 4.9      Eosinophils % 4.1      Basophils % 0.4      Neutrophils Absolute 6.70      Immature Granulocytes Absolute, Automated 0.04      Lymphocytes Absolute 3.38      Monocytes Absolute 0.55      Eosinophils Absolute 0.46      Basophils Absolute 0.05          CT cervical spine wo IV contrast   Final Result   No acute fracture or subluxation of the  cervical spine.        Mild reversal of the cervical lordosis which may be exaggerated by   positioning and/or spasm.        Mild facet arthrosis on the left at C3-4 with mild foraminal   narrowing.        MACRO:   None.        Signed by: Avtar Colon 12/6/2023 2:10 PM   Dictation workstation:   [id number]      CT head wo IV contrast   Final Result   No acute intracranial process.        Age-indeterminate partial opacification of the mastoid air cells   bilaterally compatible with mastoiditis.        Mild peripheral polypoid mucosal thickening of the paranasal sinuses   without visualized air-fluid level.        MACRO:   None.        Signed by: Avtar Colon 12/6/2023 2:06 PM   Dictation workstation:   [id number]           Procedures     Medical Decision Making  Scalp paresthesias CT scan the brain and neck were ordered as well as appropriate labs at this time for differential diagnosis of paresthesia stroke cervical radiculopathy.  Her CBC was normal metabolic panel was also normal CT scan of the brain showed some age-indeterminate opacification of the mastoid cells CT scan of the cervical spine shows some spinal stenosis.  Patient will be started on prednisone tapering dose referral to her primary care Dr. Newsome for follow-up    Amount and/or Complexity of Data Reviewed  ECG/medicine tests: independent interpretation performed.     Details: Lead EKG showed sinus rhythm at 83/min there is no acute ST segment elevation depressions or arrhythmia impression normal twelve-lead EKG         Diagnoses as of 12/06/23 1438   Foraminal stenosis of cervical region   Paresthesia                    Tyler Templeton MD  12/06/23 1439

## 2023-12-07 ENCOUNTER — TELEPHONE (OUTPATIENT)
Dept: PRIMARY CARE | Facility: CLINIC | Age: 38
End: 2023-12-07
Payer: COMMERCIAL

## 2023-12-07 NOTE — TELEPHONE ENCOUNTER
PT WAS GIVEN A RX FOR PREDNISONE IN THE ER.  SHE WANTS TO KNOW IF IT'S SAFE TO TAKE THIS WITH HER CURRENT MEDICATIONS.  I WILL BE LEAVING AT 4:00PM TODAY SO PLEASE RESPOND TO MP GARCIA. PT WOULD LIKE TO KNOW BEFORE SHE STARTS THE MEDICATION SO SHE'D LIKE A CALL BACK TODAY.

## 2024-01-03 ENCOUNTER — APPOINTMENT (OUTPATIENT)
Dept: PRIMARY CARE | Facility: CLINIC | Age: 39
End: 2024-01-03
Payer: COMMERCIAL

## 2024-01-10 ENCOUNTER — LAB (OUTPATIENT)
Dept: LAB | Facility: LAB | Age: 39
End: 2024-01-10
Payer: COMMERCIAL

## 2024-01-10 ENCOUNTER — OFFICE VISIT (OUTPATIENT)
Dept: PRIMARY CARE | Facility: CLINIC | Age: 39
End: 2024-01-10
Payer: COMMERCIAL

## 2024-01-10 VITALS
WEIGHT: 209 LBS | DIASTOLIC BLOOD PRESSURE: 68 MMHG | SYSTOLIC BLOOD PRESSURE: 112 MMHG | BODY MASS INDEX: 39.49 KG/M2 | HEART RATE: 119 BPM | OXYGEN SATURATION: 98 %

## 2024-01-10 DIAGNOSIS — M48.02 FORAMINAL STENOSIS OF CERVICAL REGION: Primary | ICD-10-CM

## 2024-01-10 DIAGNOSIS — I45.81 PROLONGED QT SYNDROME: ICD-10-CM

## 2024-01-10 DIAGNOSIS — I27.29 OTHER SECONDARY PULMONARY HYPERTENSION (MULTI): ICD-10-CM

## 2024-01-10 DIAGNOSIS — R94.31 PROLONGED QT INTERVAL: ICD-10-CM

## 2024-01-10 DIAGNOSIS — E03.8 HYPOTHYROIDISM, SECONDARY: ICD-10-CM

## 2024-01-10 DIAGNOSIS — I10 HTN (HYPERTENSION), BENIGN: ICD-10-CM

## 2024-01-10 DIAGNOSIS — D82.1 DIGEORGE'S SYNDROME (MULTI): ICD-10-CM

## 2024-01-10 DIAGNOSIS — Z00.00 ENCOUNTER FOR GENERAL ADULT MEDICAL EXAMINATION WITHOUT ABNORMAL FINDINGS: ICD-10-CM

## 2024-01-10 DIAGNOSIS — E66.09 CLASS 2 OBESITY DUE TO EXCESS CALORIES WITHOUT SERIOUS COMORBIDITY WITH BODY MASS INDEX (BMI) OF 39.0 TO 39.9 IN ADULT: ICD-10-CM

## 2024-01-10 DIAGNOSIS — E55.9 VITAMIN D DEFICIENCY: ICD-10-CM

## 2024-01-10 DIAGNOSIS — R09.82 POST-NASAL DRIP: ICD-10-CM

## 2024-01-10 DIAGNOSIS — J45.40 MODERATE PERSISTENT ASTHMA WITHOUT COMPLICATION (HHS-HCC): ICD-10-CM

## 2024-01-10 DIAGNOSIS — I27.21 SECONDARY PULMONARY ARTERIAL HYPERTENSION (MULTI): ICD-10-CM

## 2024-01-10 DIAGNOSIS — G40.909 SEIZURE DISORDER (MULTI): ICD-10-CM

## 2024-01-10 PROBLEM — M25.562 LEFT KNEE PAIN: Status: RESOLVED | Noted: 2023-04-12 | Resolved: 2024-01-10

## 2024-01-10 PROBLEM — E03.4 HYPOTHYROIDISM DUE TO ACQUIRED ATROPHY OF THYROID: Status: RESOLVED | Noted: 2021-12-10 | Resolved: 2024-01-10

## 2024-01-10 PROBLEM — H69.93 DYSFUNCTION OF BOTH EUSTACHIAN TUBES: Status: RESOLVED | Noted: 2023-05-30 | Resolved: 2024-01-10

## 2024-01-10 PROBLEM — M20.001 FINGER DEFORMITY, RIGHT: Status: RESOLVED | Noted: 2023-07-18 | Resolved: 2024-01-10

## 2024-01-10 PROBLEM — L30.9 DERMATITIS: Status: RESOLVED | Noted: 2023-04-12 | Resolved: 2024-01-10

## 2024-01-10 PROBLEM — R42 DIZZINESS: Status: RESOLVED | Noted: 2023-04-13 | Resolved: 2024-01-10

## 2024-01-10 LAB
25(OH)D3 SERPL-MCNC: 37 NG/ML (ref 30–100)
T4 FREE SERPL-MCNC: 1.99 NG/DL (ref 0.61–1.12)
TSH SERPL-ACNC: 0.04 MIU/L (ref 0.44–3.98)

## 2024-01-10 PROCEDURE — 1036F TOBACCO NON-USER: CPT | Performed by: INTERNAL MEDICINE

## 2024-01-10 PROCEDURE — 84443 ASSAY THYROID STIM HORMONE: CPT

## 2024-01-10 PROCEDURE — 3078F DIAST BP <80 MM HG: CPT | Performed by: INTERNAL MEDICINE

## 2024-01-10 PROCEDURE — 3074F SYST BP LT 130 MM HG: CPT | Performed by: INTERNAL MEDICINE

## 2024-01-10 PROCEDURE — 99214 OFFICE O/P EST MOD 30 MIN: CPT | Performed by: INTERNAL MEDICINE

## 2024-01-10 PROCEDURE — 36415 COLL VENOUS BLD VENIPUNCTURE: CPT

## 2024-01-10 PROCEDURE — 82306 VITAMIN D 25 HYDROXY: CPT

## 2024-01-10 PROCEDURE — 84439 ASSAY OF FREE THYROXINE: CPT

## 2024-01-10 PROCEDURE — 3008F BODY MASS INDEX DOCD: CPT | Performed by: INTERNAL MEDICINE

## 2024-01-10 RX ORDER — LEVOTHYROXINE SODIUM 75 UG/1
TABLET ORAL
Qty: 90 TABLET | Refills: 1 | Status: SHIPPED | OUTPATIENT
Start: 2024-01-10

## 2024-01-10 RX ORDER — POTASSIUM CHLORIDE 750 MG/1
10 CAPSULE, EXTENDED RELEASE ORAL DAILY
Qty: 90 CAPSULE | Refills: 1 | Status: SHIPPED | OUTPATIENT
Start: 2024-01-10

## 2024-01-10 RX ORDER — FLUTICASONE PROPIONATE 50 MCG
2 SPRAY, SUSPENSION (ML) NASAL DAILY
Qty: 48 ML | Refills: 1 | Status: SHIPPED | OUTPATIENT
Start: 2024-01-10

## 2024-01-10 RX ORDER — LEVOTHYROXINE SODIUM 300 UG/1
TABLET ORAL
Qty: 90 TABLET | Refills: 1 | Status: SHIPPED | OUTPATIENT
Start: 2024-01-10

## 2024-01-10 RX ORDER — METOPROLOL SUCCINATE 50 MG/1
50 TABLET, EXTENDED RELEASE ORAL DAILY
Qty: 90 TABLET | Refills: 1 | Status: SHIPPED | OUTPATIENT
Start: 2024-01-10 | End: 2024-07-08

## 2024-01-10 RX ORDER — IBUPROFEN 800 MG/1
800 TABLET ORAL EVERY 8 HOURS PRN
Qty: 30 TABLET | Refills: 2 | Status: SHIPPED | OUTPATIENT
Start: 2024-01-10 | End: 2024-02-09

## 2024-01-10 RX ORDER — ERGOCALCIFEROL 1.25 MG/1
1 CAPSULE ORAL
Qty: 4 CAPSULE | Refills: 5 | Status: SHIPPED | OUTPATIENT
Start: 2024-01-10

## 2024-01-10 ASSESSMENT — ENCOUNTER SYMPTOMS
DIARRHEA: 0
BACK PAIN: 0
NAUSEA: 0
VOMITING: 0
WHEEZING: 0
ABDOMINAL PAIN: 0
FATIGUE: 0
SHORTNESS OF BREATH: 0
ARTHRALGIAS: 0
PALPITATIONS: 0
COUGH: 0
BLOOD IN STOOL: 0

## 2024-01-10 ASSESSMENT — PATIENT HEALTH QUESTIONNAIRE - PHQ9
SUM OF ALL RESPONSES TO PHQ9 QUESTIONS 1 AND 2: 0
2. FEELING DOWN, DEPRESSED OR HOPELESS: NOT AT ALL
1. LITTLE INTEREST OR PLEASURE IN DOING THINGS: NOT AT ALL

## 2024-01-10 NOTE — PROGRESS NOTES
"Subjective   Patient ID: Bety Ibrahim is a 38 y.o. female who presents for Follow-up (6 MO FUV).  HPI  She is here today for a follow-up visit.  Unfortunately she developed severe neck pain with some numbness involving her arms and she went to the emergency department on December 6.  There they performed a CT scan of her cervical spine did discover that she had significant arthritis with foraminal stenosis.  She is doing better now but she states on occasion she gets severe neck pain especially when she is sitting\" painting my diamonds \".  She states she took her mother's ibuprofen 800 mg and it really helped a lot.  I did agree to give her a prescription but she understands that this is very high dose and we do not want her taking it all the time.  I also suggested she might benefit from doing physical therapy for her neck and she is agreeable as long as the weather holds out.  I will help facilitate a referral and if she is getting worse we even talked about a referral to the pain clinic.  She also recently saw Dr. Stout for her cardiac condition and prolonged QT syndrome.  She states she is scheduled to see him again in February and plans on keeping that appointment  We also briefly discussed her diagnosis of asthma which has been very stable  We also discussed her thyroid condition and her last TSH was grossly abnormal.  We are giving her refills on her thyroid medication today and we asked that she get a thyroid blood test done today before leaving.  I have agreed to call her with results.  I also desperately want her to follow-up with her endocrinologist since her condition has been quite complicated.  She states she needs a phone number of his office again and I can have the staff look that up for her.  She also had a history of seizure disorder many years ago but she has not had a seizure for over a decade and she has not required medication..  She states she had a seizure when her electrolytes were " severely abnormal and we will try to keep those regulated.    Review of Systems   Constitutional:  Negative for fatigue.   Respiratory:  Negative for cough, shortness of breath and wheezing.    Cardiovascular:  Negative for chest pain, palpitations and leg swelling.   Gastrointestinal:  Negative for abdominal pain, blood in stool, diarrhea, nausea and vomiting.   Musculoskeletal:  Negative for arthralgias and back pain.     Objective   Physical Exam  Vitals and nursing note reviewed.   Constitutional:       General: She is not in acute distress.     Appearance: Normal appearance.   HENT:      Head: Normocephalic and atraumatic.   Eyes:      Conjunctiva/sclera: Conjunctivae normal.   Cardiovascular:      Rate and Rhythm: Normal rate and regular rhythm.      Heart sounds: Normal heart sounds.   Pulmonary:      Effort: No respiratory distress.      Breath sounds: No wheezing.   Abdominal:      Palpations: Abdomen is soft.      Tenderness: There is no abdominal tenderness. There is no guarding.   Musculoskeletal:         General: No swelling. Normal range of motion.   Skin:     General: Skin is warm and dry.   Neurological:      General: No focal deficit present.      Mental Status: She is alert and oriented to person, place, and time.   Psychiatric:         Behavior: Behavior normal.       Assessment/Plan   Problem List Items Addressed This Visit             ICD-10-CM    HTN (hypertension), benign I10     -Blood pressure risk under excellent control and she will continue with her antihypertensive regimen         Relevant Medications    metoprolol succinate XL (Toprol-XL) 50 mg 24 hr tablet    Hypothyroidism, secondary E03.8     -She will get a TSH and free T4 today before leaving and have agreed to contact her with results  -I have course want her to follow with her endocrinologist, Jordy Paige because of the complicated measure of her thyroid condition         Relevant Medications    levothyroxine (Synthroid,  Levoxyl) 75 mcg tablet    levothyroxine (Synthroid, Unithroid) 300 mcg tablet    Other Relevant Orders    TSH    T4, free    Moderate persistent asthma J45.40     -Stable         Post-nasal drip R09.82     -She is using Flonase which has helped controlled her symptoms         Relevant Medications    fluticasone (Flonase) 50 mcg/actuation nasal spray    Prolonged QT syndrome I45.81     -She has been seen and evaluated by Dr. Stout and has a follow-up appointment with him in February         Relevant Medications    metoprolol succinate XL (Toprol-XL) 50 mg 24 hr tablet    Seizure disorder (CMS/HCC) G40.909     -Has not had a seizure for over 10 years  -She has not required medication for several years and she states that the neurologist indicated that she just needed to make sure her calcium level stayed stable to prevent seizures in the future         Vitamin D deficiency E55.9     -We will check a vitamin D level today and have agreed to contact her with results  -I have provided refill on her vitamin D supplementation         Relevant Medications    ergocalciferol (Vitamin D-2) 1.25 MG (71341 UT) capsule    Other Relevant Orders    Vitamin D 25-Hydroxy,Total (for eval of Vitamin D levels)    DiGeorge's syndrome (CMS/HCC) D82.1     -Stable         Class 2 obesity due to excess calories without serious comorbidity with body mass index (BMI) of 39.0 to 39.9 in adult E66.09, Z68.39     -Encouraged her to eat healthy, exercise regularly, and try to get as close to ideal body weight as possible         RESOLVED: Prolonged QT interval R94.31    Secondary pulmonary arterial hypertension (CMS/HCC) I27.21     -Stable at this time         Foraminal stenosis of cervical region - Primary M48.02     -She was seen recently in the emergency department on December 6 with neck pain and symptoms of numbness and tingling with radiculopathy  -They did a CT scan which revealed significant foraminal stenosis  -I did agree to give her  high-dose ibuprofen medication but she knows to use this on a sparing basis.  (She states she took one of her mother's which really helped with the pain and 1 did the trick)  -I am also setting her up for physical therapy         Relevant Medications    ibuprofen 800 mg tablet    Other Relevant Orders    Referral to Physical Therapy    RESOLVED: Other secondary pulmonary hypertension (CMS/[facility]) I27.29     Other Visit Diagnoses         Codes    Encounter for general adult medical examination without abnormal findings     Z00.00    Relevant Medications    potassium chloride ER (Micro-K) 10 mEq ER capsule               Mary Rosenberg DO

## 2024-01-10 NOTE — ASSESSMENT & PLAN NOTE
-Has not had a seizure for over 10 years  -She has not required medication for several years and she states that the neurologist indicated that she just needed to make sure her calcium level stayed stable to prevent seizures in the future

## 2024-01-10 NOTE — PATIENT INSTRUCTIONS
As we discussed we will have you stop today at the lab so we can check your thyroid blood test and your vitamin D level.  Once these results are known we will contact you  I will ask the staff to give you the phone number of your endocrinologist and please make an appointment with him right away so that he can help manage your thyroid condition  We have given you refills on most of your medicines today  The staff will be helping you to get an appointment for physical therapy because of your neck.  I did agree to give you a prescription for high-dose ibuprofen 800 mg but please remember to use this on a sparing basis because high-dose ibuprofen if taken regularly could cause a lot of problems with your kidneys and stomach.  Please also remember to always take it with food  Please remember to keep your appointment with your cardiologist  We will call you with the lab results and then decide from there when you need to be seen next

## 2024-01-10 NOTE — ASSESSMENT & PLAN NOTE
-She has been seen and evaluated by Dr. Stout and has a follow-up appointment with him in February

## 2024-01-10 NOTE — ASSESSMENT & PLAN NOTE
-Blood pressure risk under excellent control and she will continue with her antihypertensive regimen

## 2024-01-10 NOTE — ASSESSMENT & PLAN NOTE
-We will check a vitamin D level today and have agreed to contact her with results  -I have provided refill on her vitamin D supplementation

## 2024-01-10 NOTE — ASSESSMENT & PLAN NOTE
-Encouraged her to eat healthy, exercise regularly, and try to get as close to ideal body weight as possible

## 2024-01-10 NOTE — ASSESSMENT & PLAN NOTE
-She was seen recently in the emergency department on December 6 with neck pain and symptoms of numbness and tingling with radiculopathy  -They did a CT scan which revealed significant foraminal stenosis  -I did agree to give her high-dose ibuprofen medication but she knows to use this on a sparing basis.  (She states she took one of her mother's which really helped with the pain and 1 did the trick)  -I am also setting her up for physical therapy

## 2024-01-10 NOTE — LETTER
January 12, 2024     Bety Ibrahim  [address]  [facility] [id number]      Dear MsSherrie Ibrahim:    Below are the results from your recent visit:    Resulted Orders   TSH   Result Value Ref Range    Thyroid Stimulating Hormone 0.04 (L) 0.44 - 3.98 mIU/L    Narrative    TSH testing is performed using different testing methodology at [facility] than at other [facility]. Direct result comparisons should only be made within the same method.     T4, free   Result Value Ref Range    Thyroxine, Free 1.99 (H) 0.61 - 1.12 ng/dL    Narrative    Thyroxine Free testing is performed using different testing methodology at [facility] than at other [facility]. Direct result comparisons should only be made within the same method.    Biotin can cause falsely elevated free T4 results. Patients taking a Biotin dose of up to 10 mg/day should refrain from taking Biotin for 24 hours before sample collection. Patient taking a Biotin dose of >10 mg/day should consult with their physician or the laboratory before the blood draw.   Vitamin D 25-Hydroxy,Total (for eval of Vitamin D levels)   Result Value Ref Range    Vitamin D, 25-Hydroxy, Total 37 30 - 100 ng/mL    Narrative    Deficiency:         < 20   ng/ml  Insufficiency:      20-29  ng/ml  Sufficiency:         ng/ml  This assay accurately quantifies the sum of Vitamin D3, 25-Hydroxy and Vitamin D2,25-Hydroxy.     Please call and let her know that her vitamin D level came back okay at 37 so we will keep her vitamin D dosing the same. Her thyroid blood work is still significantly abnormal and she does have an endocrinologist, Jordy Paige.  She asked for his phone number from our office and we did give it to her.  She needs to call right away to make an appointment because her thyroid condition has been complicated.  She is on very high doses of thyroid supplementation.  Please call her and make sure that she is making an appointment to see  him ASAP.  Thank you!     If you have any questions or concerns, please don't hesitate to call.         Sincerely,        ROMI [id number] [facility] TECH

## 2024-01-10 NOTE — ASSESSMENT & PLAN NOTE
-She will get a TSH and free T4 today before leaving and have agreed to contact her with results  -I have course want her to follow with her endocrinologist, Jordy Paige because of the complicated measure of her thyroid condition

## 2024-01-11 NOTE — RESULT ENCOUNTER NOTE
Please call and let her know that her vitamin D level came back okay at 37 so we will keep her vitamin D dosing the same  Her thyroid blood work is still significantly abnormal and she does have an endocrinologist, Jordy aPige.  She asked for his phone number from our office and we did give it to her.  She needs to call right away to make an appointment because her thyroid condition has been complicated.  She is on very high doses of thyroid supplementation.  Please call her and make sure that she is making an appointment to see him ASAP.  Thank you!

## 2024-01-23 ENCOUNTER — DOCUMENTATION (OUTPATIENT)
Dept: PHYSICAL THERAPY | Facility: CLINIC | Age: 39
End: 2024-01-23
Payer: COMMERCIAL

## 2024-01-23 ENCOUNTER — APPOINTMENT (OUTPATIENT)
Dept: PHYSICAL THERAPY | Facility: CLINIC | Age: 39
End: 2024-01-23
Payer: COMMERCIAL

## 2024-01-23 NOTE — PROGRESS NOTES
Physical Therapy                 Therapy Communication Note    Patient Name: Bety Ibrahim  MRN: [id number]  Today's Date: 1/23/2024     Discipline: Physical Therapy    Missed Visit Reason:  Reason B    Missed Time: Cancel    Comment:

## 2024-02-02 ENCOUNTER — DOCUMENTATION (OUTPATIENT)
Dept: PHYSICAL THERAPY | Facility: CLINIC | Age: 39
End: 2024-02-02

## 2024-04-29 ENCOUNTER — TELEPHONE (OUTPATIENT)
Dept: PRIMARY CARE | Facility: CLINIC | Age: 39
End: 2024-04-29
Payer: COMMERCIAL

## 2024-04-29 NOTE — TELEPHONE ENCOUNTER
Patient called and is requesting an ATB for a sinus infection and sore throat. She has been sick since Saturday. She said she has tried warm showers, chicken broth for her sore throat, throat spray. But nothing seems to be helping. I did let her know that you would likely require an appt but I would ask.

## 2024-04-29 NOTE — TELEPHONE ENCOUNTER
Please advise that she would need to be seen for an evaluation and probably the best thing to do would be to go to urgent care.  (I thought she was sent a letter due to multiple cancellations/no-shows and noncompliance???)

## 2024-05-01 DIAGNOSIS — E03.8 HYPOTHYROIDISM, SECONDARY: Primary | ICD-10-CM

## 2024-05-01 DIAGNOSIS — E83.51 HYPOCALCEMIA: Primary | ICD-10-CM

## 2024-05-01 DIAGNOSIS — E03.8 HYPOTHYROIDISM, SECONDARY: ICD-10-CM

## 2024-05-01 DIAGNOSIS — I10 HTN (HYPERTENSION), BENIGN: ICD-10-CM

## 2024-05-06 ENCOUNTER — TELEPHONE (OUTPATIENT)
Dept: PRIMARY CARE | Facility: CLINIC | Age: 39
End: 2024-05-06
Payer: COMMERCIAL

## 2024-05-06 DIAGNOSIS — R05.1 ACUTE COUGH: Primary | ICD-10-CM

## 2024-05-06 RX ORDER — BENZONATATE 200 MG/1
200 CAPSULE ORAL 3 TIMES DAILY PRN
Qty: 42 CAPSULE | Refills: 0 | Status: SHIPPED | OUTPATIENT
Start: 2024-05-06 | End: 2024-06-05

## 2024-05-06 NOTE — TELEPHONE ENCOUNTER
Patient is still struggling with her sinuses. She did go to the urgent care last week and everything has seemed to clear up except this cough that is keeping her awake. She would like to know if you would be willing to send her something in for her cough. She stated the OTC cough medicine is not helping. I did let her know that  is out of the office today but I will send you the message to check for her.

## 2024-05-06 NOTE — TELEPHONE ENCOUNTER
Please let her know that I sent in some cough medicine and I hope that this is helpful.  Thank you!

## 2024-05-20 ENCOUNTER — APPOINTMENT (OUTPATIENT)
Dept: PRIMARY CARE | Facility: CLINIC | Age: 39
End: 2024-05-20
Payer: COMMERCIAL

## 2024-05-21 ENCOUNTER — APPOINTMENT (OUTPATIENT)
Dept: PRIMARY CARE | Facility: CLINIC | Age: 39
End: 2024-05-21
Payer: COMMERCIAL

## 2024-07-23 ENCOUNTER — APPOINTMENT (OUTPATIENT)
Dept: PRIMARY CARE | Facility: CLINIC | Age: 39
End: 2024-07-23
Payer: COMMERCIAL

## 2024-07-23 VITALS
HEIGHT: 61 IN | DIASTOLIC BLOOD PRESSURE: 82 MMHG | WEIGHT: 205 LBS | HEART RATE: 91 BPM | SYSTOLIC BLOOD PRESSURE: 128 MMHG | OXYGEN SATURATION: 96 % | BODY MASS INDEX: 38.71 KG/M2

## 2024-07-23 DIAGNOSIS — N63.20 MASS OF LEFT BREAST, UNSPECIFIED QUADRANT: Primary | ICD-10-CM

## 2024-07-23 DIAGNOSIS — E03.8 HYPOTHYROIDISM, SECONDARY: ICD-10-CM

## 2024-07-23 DIAGNOSIS — E55.9 VITAMIN D DEFICIENCY: ICD-10-CM

## 2024-07-23 DIAGNOSIS — R10.2 PELVIC PAIN: ICD-10-CM

## 2024-07-23 DIAGNOSIS — E20.9 HYPOPARATHYROIDISM, UNSPECIFIED HYPOPARATHYROIDISM TYPE (MULTI): ICD-10-CM

## 2024-07-23 DIAGNOSIS — I10 HTN (HYPERTENSION), BENIGN: ICD-10-CM

## 2024-07-23 DIAGNOSIS — E83.51 HYPOCALCEMIA: ICD-10-CM

## 2024-07-23 DIAGNOSIS — Q93.81 22Q11.2 DELETION SYNDROME (HHS-HCC): ICD-10-CM

## 2024-07-23 DIAGNOSIS — I45.81 PROLONGED QT SYNDROME: ICD-10-CM

## 2024-07-23 DIAGNOSIS — Z00.00 ENCOUNTER FOR GENERAL ADULT MEDICAL EXAMINATION WITHOUT ABNORMAL FINDINGS: ICD-10-CM

## 2024-07-23 PROCEDURE — 3008F BODY MASS INDEX DOCD: CPT | Performed by: INTERNAL MEDICINE

## 2024-07-23 PROCEDURE — 99214 OFFICE O/P EST MOD 30 MIN: CPT | Performed by: INTERNAL MEDICINE

## 2024-07-23 PROCEDURE — 3079F DIAST BP 80-89 MM HG: CPT | Performed by: INTERNAL MEDICINE

## 2024-07-23 PROCEDURE — 3074F SYST BP LT 130 MM HG: CPT | Performed by: INTERNAL MEDICINE

## 2024-07-23 PROCEDURE — 1036F TOBACCO NON-USER: CPT | Performed by: INTERNAL MEDICINE

## 2024-07-23 RX ORDER — FERROUS SULFATE, DRIED 160(50) MG
1 TABLET, EXTENDED RELEASE ORAL 2 TIMES DAILY
Qty: 60 TABLET | Refills: 5 | Status: SHIPPED | OUTPATIENT
Start: 2024-07-23

## 2024-07-23 RX ORDER — METOPROLOL SUCCINATE 50 MG/1
50 TABLET, EXTENDED RELEASE ORAL DAILY
Qty: 90 TABLET | Refills: 1 | Status: SHIPPED | OUTPATIENT
Start: 2024-07-23 | End: 2025-01-19

## 2024-07-23 RX ORDER — POTASSIUM CHLORIDE 750 MG/1
10 CAPSULE, EXTENDED RELEASE ORAL DAILY
Qty: 90 CAPSULE | Refills: 1 | Status: SHIPPED | OUTPATIENT
Start: 2024-07-23

## 2024-07-23 ASSESSMENT — PATIENT HEALTH QUESTIONNAIRE - PHQ9
2. FEELING DOWN, DEPRESSED OR HOPELESS: NOT AT ALL
SUM OF ALL RESPONSES TO PHQ9 QUESTIONS 1 AND 2: 0
1. LITTLE INTEREST OR PLEASURE IN DOING THINGS: NOT AT ALL

## 2024-07-23 ASSESSMENT — ENCOUNTER SYMPTOMS
COUGH: 0
PALPITATIONS: 0
NAUSEA: 0
WHEEZING: 0
CHEST TIGHTNESS: 0
ARTHRALGIAS: 0
FATIGUE: 0
SHORTNESS OF BREATH: 0
BLOOD IN STOOL: 0
DIARRHEA: 0
ABDOMINAL PAIN: 0
VOMITING: 0
BACK PAIN: 0

## 2024-07-23 NOTE — PATIENT INSTRUCTIONS
As we discussed because of your complicated thyroid and hypoparathyroidism it is very important that you see your endocrinologist regularly.  I am not capable of managing your particular condition as it is rare and needs the specialist.  Your endocrinologist is named Dr. Jordy Paige.  Please call his office for an appointment and please remember that you should have a system whereby you record every single appointment so that you are sure not to forget to show up.  It is important that you get in to the endocrinologist ASAP because your current condition is not being managed due to your lack of follow-up.  If you feel like you are going to have to miss an appointment doctor offices want you to call a day in advance to reschedule.  Try to get to this appointment because if you reschedule you could have to wait several months for your next appointment and I want you to get in soon.  I did agree to give you a refill on your calcium supplement and I also ordered some thyroid blood work so that it will be available when you see your endocrinologist  You also may have a serious heart condition because of an abnormal EKG.  It is called prolonged QT syndrome.  You were seeing Dr. Stout who is a cardiologist here in [place].  Sadly you missed your last appointment with him and you did not call.  Please call the office and ask to be seen again and make sure you keep these appointments.  I have ordered a special mammogram of your breasts to be done because of the lump you had been feeling.  The staff to get you scheduled and once results are known I will contact you  We also need to see a gynecologist to get a Pap exam done to make sure you are okay.  We discussed how a Pap is done today so please keep that appointment so you can make sure you are okay  Please call me if you have any questions or confusion about what we are doing or if you are not feeling well.  Otherwise I will see you back in 6 months at the new office on  [address] our our office is right next-door to the [address]

## 2024-07-23 NOTE — ASSESSMENT & PLAN NOTE
-I did not appreciate a mass however she is feeling something so I am ordering a diagnostic mammogram and have agreed to contact her with the results

## 2024-07-23 NOTE — PROGRESS NOTES
Subjective   Patient ID: Bety Ibrahim is a 39 y.o. female who presents for Follow-up (6 MO FUV).  HPI  She is here today for a routine checkup.  We did conduct a review of systems and we discussed some of her concerns as of recent.  She states she has felt a lump in her left breast but today she could not find it.  She states her breasts have been sore.  She is in her 40s and therefore I think she deserves to have a mammogram.  I will order a diagnostic since she is feeling something abnormal and once results are known I will contact her.  We also discussed other concerns she had she states sometimes she has pain and she points to the pelvic region.  She states she has not had a Pap for many years and she is sexually active.  I told her it is imperative we get her into gynecology so that she can have a thorough evaluation with a Pap exam.  She is agreeable to making that appointment.  We also discussed her other complicated past medical history including hypoparathyroidism as well as vitamin D resistance and her thyroid issues.  She is supposed to be following with endocrinology but unfortunately she misses appointments with her specialist.  We had a long conversation about the importance of maintaining all appointments and she understands that if she continues not to show up she can be dismissed from the practice.  I told her to make a schedule and her phone because no one can rely on memory with all these appointments.  She understands that if she cannot make an appointment that she should call days in advance to reschedule.  I made it clear that she needs to see the endocrinologist because of her endocrine issues that I am not able to care for.  I have agreed to order some lab work for her thyroid.  I also agreed to provide a refill on her calcium supplementation.  She also needs to see cardiology because of her prolonged QT syndrome.  She missed her last appointment with Dr. Stout.  I will give her a list  of appointments that she needs to make and get caught up with when she will call us if she is having problems following through.  Review of Systems   Constitutional:  Negative for fatigue.   Respiratory:  Negative for cough, chest tightness, shortness of breath and wheezing.    Cardiovascular:  Negative for chest pain, palpitations and leg swelling.   Gastrointestinal:  Negative for abdominal pain, blood in stool, diarrhea, nausea and vomiting.   Musculoskeletal:  Negative for arthralgias and back pain.     Objective   Physical Exam  Vitals and nursing note reviewed.   Constitutional:       General: She is not in acute distress.     Appearance: Normal appearance.   HENT:      Head: Normocephalic and atraumatic.   Eyes:      Conjunctiva/sclera: Conjunctivae normal.   Cardiovascular:      Rate and Rhythm: Normal rate and regular rhythm.      Heart sounds: Normal heart sounds.   Pulmonary:      Effort: No respiratory distress.      Breath sounds: No wheezing.   Abdominal:      Palpations: Abdomen is soft.      Tenderness: There is no abdominal tenderness. There is no guarding.   Musculoskeletal:         General: No swelling. Normal range of motion.   Skin:     General: Skin is warm and dry.   Neurological:      General: No focal deficit present.      Mental Status: She is alert and oriented to person, place, and time.   Psychiatric:         Behavior: Behavior normal.       Assessment/Plan   Problem List Items Addressed This Visit             ICD-10-CM    HTN (hypertension), benign I10     -Today her blood pressure appears to be under good control and we will continue to monitor         Relevant Medications    metoprolol succinate XL (Toprol-XL) 50 mg 24 hr tablet    Other Relevant Orders    Basic Metabolic Panel    Hypocalcemia E83.51    Relevant Medications    calcium carbonate-vitamin D3 500 mg-5 mcg (200 unit) tablet    Hypothyroidism, secondary E03.8    Relevant Orders    TSH    T4, free    Prolonged QT syndrome  I45.81     -She has agreed to make an appointment with cardiology and she understands that she should not miss any future appointments         Relevant Medications    metoprolol succinate XL (Toprol-XL) 50 mg 24 hr tablet    Vitamin D deficiency E55.9    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 Jordy Paige and she will be calling his office for a follow-up as soon as possible  -I did refill her calcium supplementation and have agreed to order some thyroid blood work to get the ball rolling         Pelvic pain R10.2     -She has not had a Pap for many years and she is sexually active so she understands that we want her to get in for a Pap exam and I am referring her to gynecology         Relevant Orders    Referral to Gynecology    Mass of left breast - Primary N63.20     -I did not appreciate a mass however she is feeling something so I am ordering a diagnostic mammogram and have agreed to contact her with the results         Relevant Orders    BI mammo bilateral diagnostic tomosynthesis     Other Visit Diagnoses         Codes    Encounter for general adult medical examination without abnormal findings     Z00.00    Relevant Medications    potassium chloride ER (Micro-K) 10 mEq ER capsule               Mary Rosenberg,

## 2024-07-23 NOTE — ASSESSMENT & PLAN NOTE
-She has agreed to make an appointment with cardiology and she understands that she should not miss any future appointments

## 2024-07-23 NOTE — ASSESSMENT & PLAN NOTE
-She has been under the care of endocrinology, Dr. Jordy Paige and she will be calling his office for a follow-up as soon as possible  -I did refill her calcium supplementation and have agreed to order some thyroid blood work to get the ball rolling

## 2024-07-23 NOTE — ASSESSMENT & PLAN NOTE
-She has not had a Pap for many years and she is sexually active so she understands that we want her to get in for a Pap exam and I am referring her to gynecology

## 2024-07-24 ENCOUNTER — TELEPHONE (OUTPATIENT)
Dept: PRIMARY CARE | Facility: CLINIC | Age: 39
End: 2024-07-24
Payer: COMMERCIAL

## 2024-07-24 DIAGNOSIS — N63.20 MASS OF LEFT BREAST, UNSPECIFIED QUADRANT: Primary | ICD-10-CM

## 2024-07-24 NOTE — TELEPHONE ENCOUNTER
Radiology would like you to put in a limited left breast ultrasound order in just in case they need to do one after mammogram.

## 2024-07-26 ENCOUNTER — APPOINTMENT (OUTPATIENT)
Dept: RADIOLOGY | Facility: HOSPITAL | Age: 39
End: 2024-07-26
Payer: COMMERCIAL

## 2024-07-31 ENCOUNTER — LAB (OUTPATIENT)
Dept: LAB | Facility: LAB | Age: 39
End: 2024-07-31
Payer: COMMERCIAL

## 2024-07-31 ENCOUNTER — HOSPITAL ENCOUNTER (OUTPATIENT)
Dept: RADIOLOGY | Facility: HOSPITAL | Age: 39
Discharge: HOME | End: 2024-07-31
Payer: COMMERCIAL

## 2024-07-31 ENCOUNTER — HOSPITAL ENCOUNTER (OUTPATIENT)
Dept: RADIOLOGY | Facility: HOSPITAL | Age: 39
End: 2024-07-31
Payer: COMMERCIAL

## 2024-07-31 VITALS — HEIGHT: 61 IN | WEIGHT: 205 LBS | BODY MASS INDEX: 38.71 KG/M2

## 2024-07-31 DIAGNOSIS — I10 HTN (HYPERTENSION), BENIGN: ICD-10-CM

## 2024-07-31 DIAGNOSIS — N63.20 MASS OF LEFT BREAST, UNSPECIFIED QUADRANT: ICD-10-CM

## 2024-07-31 DIAGNOSIS — E03.8 HYPOTHYROIDISM, SECONDARY: ICD-10-CM

## 2024-07-31 DIAGNOSIS — E83.51 HYPOCALCEMIA: ICD-10-CM

## 2024-07-31 LAB
ANION GAP SERPL CALC-SCNC: 15 MMOL/L (ref 10–20)
BUN SERPL-MCNC: 17 MG/DL (ref 6–23)
CALCIUM SERPL-MCNC: 8.8 MG/DL (ref 8.6–10.3)
CHLORIDE SERPL-SCNC: 101 MMOL/L (ref 98–107)
CO2 SERPL-SCNC: 24 MMOL/L (ref 21–32)
CREAT SERPL-MCNC: 0.91 MG/DL (ref 0.5–1.05)
EGFRCR SERPLBLD CKD-EPI 2021: 81 ML/MIN/1.73M*2
GLUCOSE SERPL-MCNC: 80 MG/DL (ref 74–99)
POTASSIUM SERPL-SCNC: 4.2 MMOL/L (ref 3.5–5.3)
SODIUM SERPL-SCNC: 136 MMOL/L (ref 136–145)
T4 FREE SERPL-MCNC: 2.42 NG/DL (ref 0.61–1.12)
TSH SERPL-ACNC: 0.02 MIU/L (ref 0.44–3.98)

## 2024-07-31 PROCEDURE — 36415 COLL VENOUS BLD VENIPUNCTURE: CPT

## 2024-07-31 PROCEDURE — 77066 DX MAMMO INCL CAD BI: CPT | Performed by: RADIOLOGY

## 2024-07-31 PROCEDURE — 84443 ASSAY THYROID STIM HORMONE: CPT

## 2024-07-31 PROCEDURE — 84439 ASSAY OF FREE THYROXINE: CPT

## 2024-07-31 PROCEDURE — 77062 BREAST TOMOSYNTHESIS BI: CPT

## 2024-07-31 PROCEDURE — 80048 BASIC METABOLIC PNL TOTAL CA: CPT

## 2024-07-31 PROCEDURE — 77062 BREAST TOMOSYNTHESIS BI: CPT | Performed by: RADIOLOGY

## 2024-08-01 ENCOUNTER — TELEPHONE (OUTPATIENT)
Age: 39
End: 2024-08-01
Payer: COMMERCIAL

## 2024-08-01 NOTE — RESULT ENCOUNTER NOTE
Please call Bety and let her know that her TSH slightly thyroid blood test is still showing that she is getting too much thyroid hormone.  We have made changes before.  Please confirm exactly how she was taking her Levothyroxine and exact dose.  After that I will make a recommendation on changing the  a right therapy plan.  Thank you

## 2024-08-02 ENCOUNTER — APPOINTMENT (OUTPATIENT)
Dept: CARDIOLOGY | Facility: CLINIC | Age: 39
End: 2024-08-02
Payer: COMMERCIAL

## 2024-08-02 VITALS
SYSTOLIC BLOOD PRESSURE: 120 MMHG | OXYGEN SATURATION: 94 % | DIASTOLIC BLOOD PRESSURE: 80 MMHG | WEIGHT: 205 LBS | HEIGHT: 61 IN | HEART RATE: 104 BPM | BODY MASS INDEX: 38.71 KG/M2

## 2024-08-02 DIAGNOSIS — E03.9 HYPOTHYROIDISM, UNSPECIFIED TYPE: Primary | ICD-10-CM

## 2024-08-02 PROCEDURE — 3074F SYST BP LT 130 MM HG: CPT | Performed by: STUDENT IN AN ORGANIZED HEALTH CARE EDUCATION/TRAINING PROGRAM

## 2024-08-02 PROCEDURE — 99214 OFFICE O/P EST MOD 30 MIN: CPT | Performed by: STUDENT IN AN ORGANIZED HEALTH CARE EDUCATION/TRAINING PROGRAM

## 2024-08-02 PROCEDURE — 1036F TOBACCO NON-USER: CPT | Performed by: STUDENT IN AN ORGANIZED HEALTH CARE EDUCATION/TRAINING PROGRAM

## 2024-08-02 PROCEDURE — 3079F DIAST BP 80-89 MM HG: CPT | Performed by: STUDENT IN AN ORGANIZED HEALTH CARE EDUCATION/TRAINING PROGRAM

## 2024-08-02 PROCEDURE — 3008F BODY MASS INDEX DOCD: CPT | Performed by: STUDENT IN AN ORGANIZED HEALTH CARE EDUCATION/TRAINING PROGRAM

## 2024-08-02 NOTE — PROGRESS NOTES
Chief Complaint   Patient presents with    1 year follow up     HPI:  I was requested by Dr. Rosenberg to evaluate this patient in consultation for cardiac assessment.    Patient is a 39-year-old non-smoker female with extensive prior medical history significant for Prolonged QT Sd S/P Defibrillator (but the defibrillator has been removed, only the electrodes remain in), anxiety, restless leg syndrome, fatigue, hypertension, hypothyroidism, seizure disorder, morbid obesity, S/P multiple knee surgeries, coming for cardiovascular assessment. Patient endorses an episode of dizziness in February by evening while resting, improved by laying down. She denies chest pain, shortness of breath, palpitations, leg edema, lightheadedness, headaches, fever, chills, orthopnea, paroxysmal nocturnal dyspnea or syncope. Of note, the patient has just completed a Holter Cardiac Monitor and the results should be ready in the next couple of weeks. She is currently asymptomatic.  The EKG today shows normal sinus rhythm with no signs of ACS; prolonged QT syndrome - QTc 485.     Patient returned last appointment feeling fine. Her monitor showed baseline sinus rhythm, minimum heart rate 65 bpm's, maximal heart rate 159 bpm's, average heart rate 90 bpm's. There were 82 PVCs with a burden of less than 0.01%. There was 1 occurrence of VT with only 3 beats. There were 101P SVC's with a burden of 0.0%. There were 2 occurrence of S VT with the longest episode of only 4 beats. There were no patient triggered events. No atrial fibrillation, atrial flutter, sinus pauses or AV block.  The echocardiogram showed normal LVEF 55% with no wall motion abnormalities. Impaired relaxation pattern of left ventricle diastolic filling.     Patient returns today feeling OK. She states that her heart rate had been high lately, but it is most probably due to uncontrolled thyroid function. She denies chest pain, shortness of breath, leg edema, lightheadedness, headaches,  "fever, chills, orthopnea, paroxysmal nocturnal dyspnea or syncope.     Past Medical History  Past Medical History:   Diagnosis Date    Personal history of diseases of the skin and subcutaneous tissue 02/18/2022    History of dermatitis       Past Surgical History  Past Surgical History:   Procedure Laterality Date    OTHER SURGICAL HISTORY  07/15/2021    Heart surgery    OTHER SURGICAL HISTORY  07/15/2021    Knee surgery       Past Family History  Family History   Problem Relation Name Age of Onset    No Known Problems Mother      No Known Problems Father      Breast cancer Cousin         Allergy History  Allergies   Allergen Reactions    Escitalopram Nausea And Vomiting    Oxycodone-Acetaminophen Nausea And Vomiting    Penicillins Hives       Past Social History  Social History     Socioeconomic History    Marital status: Single   Tobacco Use    Smoking status: Never    Smokeless tobacco: Never   Vaping Use    Vaping status: Never Used   Substance and Sexual Activity    Alcohol use: Yes     Comment: occasional    Drug use: Never       Social History     Tobacco Use   Smoking Status Never   Smokeless Tobacco Never       Objective Data:  Last Recorded Vitals:  Vitals:    08/02/24 1319   BP: 120/80   Pulse: 104   SpO2: 94%   Weight: 93 kg (205 lb)   Height: 1.549 m (5' 1\")       Last Labs:  CBC - 12/6/2023: 12:54 PM  11.2 12.9 234    40.3      CMP - 7/31/2024:  1:50 PM  8.8 8.6 21 --- 0.5   _ 4.5 26 68      PTT - No results in last year.  _   _ _     HGBA1C   Date/Time Value Ref Range Status   04/28/2023 02:20 PM 5.4 % Final     Comment:          Diagnosis of Diabetes-Adults   Non-Diabetic: < or = 5.6%   Increased risk for developing diabetes: 5.7-6.4%   Diagnostic of diabetes: > or = 6.5%  .       Monitoring of Diabetes                Age (y)     Therapeutic Goal (%)   Adults:          >18           <7.0   Pediatrics:    13-18           <7.5                   7-12           <8.0                   0- 6            " 7.5-8.5   American Diabetes Association. Diabetes Care 33(S1), Jan 2010.        Patient Medications:  Outpatient Encounter Medications as of 8/2/2024   Medication Sig Dispense Refill    calcium carbonate-vitamin D3 500 mg-5 mcg (200 unit) tablet Take 1 tablet by mouth 2 times a day. 60 tablet 5    clotrimazole-betamethasone (Lotrisone) cream Apply 1 Application topically 2 times a day. 15 g 5    ergocalciferol (Vitamin D-2) 1.25 MG (90369 UT) capsule Take 1 capsule (1,250 mcg) by mouth 1 (one) time per week. 4 capsule 5    fluticasone (Flonase) 50 mcg/actuation nasal spray Administer 2 sprays into each nostril once daily. 48 mL 1    levothyroxine (Synthroid, Levoxyl) 75 mcg tablet She will take 75 mcg with 300 mcg every day to equal 375 mcg daily 90 tablet 1    levothyroxine (Synthroid, Unithroid) 300 mcg tablet She will need to take Synthroid 300 mcg with 75 mcg at the same time every day 90 tablet 1    Mag 64 64 mg EC tablet Take 1 tablet (64 mg) by mouth once daily. 30 tablet 5    metoprolol succinate XL (Toprol-XL) 50 mg 24 hr tablet Take 1 tablet (50 mg) by mouth once daily. Do not crush or chew. 90 tablet 1    potassium chloride ER (Micro-K) 10 mEq ER capsule Take 1 capsule (10 mEq) by mouth once daily. Do not crush or chew. 90 capsule 1    albuterol 0.63 mg/3 mL nebulizer solution Take 3 mL (0.63 mg) by nebulization every 4 hours if needed.      calcitriol (Rocaltrol) 0.5 mcg capsule Take 1 capsule (0.5 mcg) by mouth once daily. (Patient not taking: Reported on 8/2/2024) 30 capsule 5     No facility-administered encounter medications on file as of 8/2/2024.       Physical Exam:  General: alert, oriented and in no acute distress. Obesity  HEENT: NC/AT; EOMI; PERRLA, external ear is normal  Neck: supple; trachea midline; no masses; no JVD  Chest: clear breath sounds bilaterally; no wheezing  Cardio: regular rhythm, S1S2 normal, no murmurs  Abdomen: Soft, non-tender, non-distension, no organomegaly  Extremities:  no clubbing/cyanosis/edema  Neuro: Grossly intact     Psychiatric: Normal mood and affect     Past Cardiology Results (Last 3 Years):  EKG:  No results found for this or any previous visit from the past 1095 days.    Echo:  Echo Results:  No results found for this or any previous visit from the past 365 days.     Cath:  No results found for this or any previous visit from the past 1095 days.    CV NCDR CATHPCI V5 COLLECTION FORM   Stress Test:  No results found for this or any previous visit from the past 1095 days.    Cardiac Imaging:  No results found for this or any previous visit from the past 1095 days.       Assessment/Plan     In summary, Mrs. Ibrahim is a 39-year-old non-smoker female with extensive prior medical history significant for Prolonged QT Sd S/P Defibrillator (but the defibrillator has been removed, only the electrodes remain in), anxiety, restless leg syndrome, fatigue, hypertension, hypothyroidism, seizure disorder, morbid obesity, S/P multiple knee surgeries, coming for assessment of dizziness and palpitations.     # Prolonged QT Syndrome  - EKG with normal sinus rhythm with no signs of ACS; prolonged QT syndrome - QTc 485.  - Prolonged QT S/P Defibrillator, but the defibrillator has been removed, only the electrodes remain in.  - Had one episode of dizziness in February by evening while resting. Otherwise asymptomatic from cardiovascular standpoint.  - Currently taking Magnesium and potassium for prolonged QT.  - Following up with EP physician - Gwendolyn Bardales  - Patient is currently asymptomatic.   - Her monitor is showing baseline sinus rhythm, minimum heart rate 65 bpm's, maximal heart rate 159 bpm's, average heart rate 90 bpm's. There were 82 PVCs with a burden of less than 0.01%. There was 1 occurrence of VT with only 3 beats. There were 101P SVC's with a burden of 0.0%. There were 2 occurrence of S VT with the longest episode of only 4 beats. There were no patient triggered events. No  atrial fibrillation, atrial flutter, sinus pauses or AV block.  - The echocardiogram showed normal LVEF 55% with no wall motion abnormalities. Impaired relaxation pattern of left ventricle diastolic filling.   - She states that her heart rate had been high lately, but it is most probably due to uncontrolled thyroid function. Would suggest better thyroid control, especially being compliant with medications - patient does not seem compliant.   - Follow up yearly.     # Hypothyroidism  - TSH 0.02  - Free T4 2,42  - Follow up with PCP for thyroid control - keep Levothyroxine 300mcg daily.     The patient has been strongly advised to be compliant with the recommendations, all the questions and concerns have been addressed. The patient has been also instructed to call, to return sooner or to go to the emergency department if symptoms persist or get worsen. The patient voiced understanding and denies any further questions at this time.     This note was transcribed using the Dragon Dictation system. There may be grammatical, punctuation, or verbiage errors that occur with voice recognition programs.     Thank you, Dr. Rosenberg, for allowing me to participate in the care of this patient. Please reach me out if you have any questions or if you need any clarifications regarding the patient's care.       Jose L Stout MD  Cardiology

## 2024-08-14 ENCOUNTER — APPOINTMENT (OUTPATIENT)
Dept: OBSTETRICS AND GYNECOLOGY | Facility: CLINIC | Age: 39
End: 2024-08-14
Payer: COMMERCIAL

## 2024-08-14 NOTE — TELEPHONE ENCOUNTER
Patient called and left . Stated she was retuning my call. I have not tried to call her again since I updated you about her thyroid medication.

## 2024-08-16 DIAGNOSIS — E03.8 HYPOTHYROIDISM, SECONDARY: ICD-10-CM

## 2024-08-16 RX ORDER — LEVOTHYROXINE SODIUM 300 UG/1
TABLET ORAL
Qty: 90 TABLET | Refills: 1 | Status: SHIPPED | OUTPATIENT
Start: 2024-08-16

## 2024-08-22 DIAGNOSIS — E55.9 VITAMIN D DEFICIENCY: ICD-10-CM

## 2024-08-22 RX ORDER — ERGOCALCIFEROL 1.25 MG/1
1 CAPSULE ORAL
Qty: 4 CAPSULE | Refills: 5 | Status: SHIPPED | OUTPATIENT
Start: 2024-08-25

## 2024-09-17 DIAGNOSIS — E55.9 VITAMIN D DEFICIENCY: ICD-10-CM

## 2024-09-18 RX ORDER — CALCITRIOL 0.5 UG/1
0.5 CAPSULE ORAL DAILY
Qty: 90 CAPSULE | Refills: 1 | Status: SHIPPED | OUTPATIENT
Start: 2024-09-18

## 2024-10-02 ENCOUNTER — TELEPHONE (OUTPATIENT)
Age: 39
End: 2024-10-02
Payer: COMMERCIAL

## 2024-10-02 NOTE — TELEPHONE ENCOUNTER
Please advise that I really would not recommend she take it since she is on so many different medicines.  If she does take it then make sure she watches out for any unusual side effects.  Thank you

## 2024-10-02 NOTE — TELEPHONE ENCOUNTER
Please advise patient to establish with a gynecologist so she can get her Pap and pelvic examination and get screened for all STIs.  Thank you

## 2024-10-04 NOTE — TELEPHONE ENCOUNTER
Patient called back and left a voicemail wanting called back. I called patient and gave her the message from Dr Rosenberg. I emphasize that she would prefer her not to take it and if she did take it that to watch for unusual side effects.

## 2024-10-08 ENCOUNTER — TELEPHONE (OUTPATIENT)
Age: 39
End: 2024-10-08
Payer: COMMERCIAL

## 2024-10-08 DIAGNOSIS — K04.7 DENTAL ABSCESS: Primary | ICD-10-CM

## 2024-10-08 RX ORDER — CLINDAMYCIN HYDROCHLORIDE 150 MG/1
450 CAPSULE ORAL 3 TIMES DAILY
Qty: 90 CAPSULE | Refills: 0 | Status: SHIPPED | OUTPATIENT
Start: 2024-10-08 | End: 2024-10-18

## 2024-10-08 NOTE — TELEPHONE ENCOUNTER
PT LEFT MESSAGE. STATES SHE TRIED TO GET SEEN BY DENTIST FOR ABCESSED TOOTH, HOWEVER THERE IS NO APPOINTMENTS UNTIL JANUARY. SHE WOULD LIKE TO KNOW IF WE COULD PRESCRIBE ANTIBIOTICS.     DR. ROTHMAN PT.

## 2024-10-08 NOTE — TELEPHONE ENCOUNTER
SPOKE WITH PT, SHE STATES SHE IS UNABLE TO COME IN DUE TO NOT HAVING A RIDE. SHE NEEDS TO CALL RIDE 2 DAYS A HEAD.

## 2024-10-08 NOTE — TELEPHONE ENCOUNTER
I sent clindamycin to her pharmacy in [place]. If she gets worse I would recommend reporting to the ER at [facility] as they may have oral surgery on call. If she can come into office she is welcome to come for appt here as well.     Kamille Elizondo MD

## 2024-10-09 NOTE — TELEPHONE ENCOUNTER
Left detailed message that antibiotics were sent to the pharmacy last night and if pt feels worse to go to the [facility] in [place].

## 2024-10-22 DIAGNOSIS — E61.2 MAGNESIUM DEFICIENCY: ICD-10-CM

## 2024-10-22 RX ORDER — MAGNESIUM CHLORIDE 64 MG
64 TABLET, DELAYED RELEASE (ENTERIC COATED) ORAL DAILY
Qty: 30 TABLET | Refills: 5 | Status: SHIPPED | OUTPATIENT
Start: 2024-10-22

## 2024-11-07 ENCOUNTER — TELEPHONE (OUTPATIENT)
Age: 39
End: 2024-11-07
Payer: COMMERCIAL

## 2024-11-07 NOTE — TELEPHONE ENCOUNTER
Patient called and left . She would like to know if you could send in some amoxicillin for a dental infection/abscess? She said the clindamycin did not help.

## 2024-11-07 NOTE — TELEPHONE ENCOUNTER
Please advise that I cannot.  Who did she get the clindamycin from?  She should go back to that provider ASAP for reevaluation.  Thank you

## 2024-11-11 ENCOUNTER — APPOINTMENT (OUTPATIENT)
Age: 39
End: 2024-11-11
Payer: COMMERCIAL

## 2024-11-11 VITALS — DIASTOLIC BLOOD PRESSURE: 80 MMHG | SYSTOLIC BLOOD PRESSURE: 126 MMHG | OXYGEN SATURATION: 98 % | HEART RATE: 103 BPM

## 2024-11-11 DIAGNOSIS — K05.00 GINGIVITIS, ACUTE, PLAQUE INDUCED: Primary | ICD-10-CM

## 2024-11-11 PROCEDURE — 3079F DIAST BP 80-89 MM HG: CPT | Performed by: INTERNAL MEDICINE

## 2024-11-11 PROCEDURE — 3074F SYST BP LT 130 MM HG: CPT | Performed by: INTERNAL MEDICINE

## 2024-11-11 PROCEDURE — 99213 OFFICE O/P EST LOW 20 MIN: CPT | Performed by: INTERNAL MEDICINE

## 2024-11-11 RX ORDER — CLINDAMYCIN HYDROCHLORIDE 300 MG/1
300 CAPSULE ORAL 2 TIMES DAILY
Qty: 20 CAPSULE | Refills: 0 | Status: SHIPPED | OUTPATIENT
Start: 2024-11-11 | End: 2024-11-21

## 2024-11-11 RX ORDER — CHLORHEXIDINE GLUCONATE ORAL RINSE 1.2 MG/ML
15 SOLUTION DENTAL AS NEEDED
Qty: 120 ML | Refills: 0 | Status: SHIPPED | OUTPATIENT
Start: 2024-11-11 | End: 2024-11-25

## 2024-11-11 NOTE — ASSESSMENT & PLAN NOTE
-She has been prescribed clindamycin twice daily as directed and she is encouraged to take it on time as directed and to completion  -She will try the chlorhexidine oral rinse as directed  -She will call her dentist to see if she can get in sooner  -She is strongly encouraged to keep her original appointment no matter what

## 2024-11-11 NOTE — PATIENT INSTRUCTIONS
As we discussed because of your penicillin allergy we need to use clindamycin and then giving you a prescription for twice daily dosing for the next 10 days.  Please take it as directed on time and please call if you are not tolerating the antibiotic.  Please keep in mind it can take several days for the antibiotic to start to work.  If you develop diarrhea please call  I also gave you an oral rinse called chlorhexidine and please use this as directed.  This has antibiotic properties that can hopefully work topically on your gums and teeth  Please call your dentist about getting in sooner than January as I think you need to have treatment long before January.  They might have an opening to get you in sooner  Please give us an update  Please remember to make an appoint with her gynecologist for thorough exam including Pap and keep in mind that you need to have a Pap smear once a year unless otherwise indicated by your gynecologist  We can see you back to go over health concerns in the future and simply make an appointment

## 2024-11-11 NOTE — PROGRESS NOTES
Subjective   Patient ID: Bety Ibrahim is a 39 y.o. female who presents for Dental Problem.  Dental Problem    She is here today with evidence of gingivitis and tooth infection.  She states she has a dentist and has an appointment in January to have her teeth extracted but when she called for assistance with an infection she was advised that they had no appointments for her.  She was seen by Dr. Elizondo during my absence on October 8 for a similar problem and was prescribed clindamycin.  She states she only took a couple doses and felt it was not helpful so she stopped.  She then started taking it again but not as directed.  Unfortunately she has a penicillin allergy and we talked about the limitations of antibiotics.  We discussed the importance of take the antibiotics as directed.  I am also going to give her chlorhexidine oral rinse which may help 2.  She does have swollen gums and her teeth are in disrepair with swelling of the gumline and plaque buildup.  I told her to make sure she keeps her appointment with her dentist and even call to see if she can get in sooner.  We also discussed the importance of getting in for a Pap exam regularly as she states she was recently diagnosed with herpes.  I told her that we could see her back for other health concerns were to have more time to discuss all of her health issues.  Review of Systems  Objective   Physical Exam  Vitals and nursing note reviewed.   Constitutional:       General: She is not in acute distress.     Appearance: Normal appearance.   HENT:      Head: Normocephalic and atraumatic.   Eyes:      Conjunctiva/sclera: Conjunctivae normal.   Cardiovascular:      Rate and Rhythm: Normal rate and regular rhythm.      Heart sounds: Normal heart sounds.   Pulmonary:      Effort: No respiratory distress.      Breath sounds: No wheezing.   Abdominal:      Palpations: Abdomen is soft.      Tenderness: There is no abdominal tenderness. There is no guarding.    Musculoskeletal:         General: No swelling. Normal range of motion.   Skin:     General: Skin is warm and dry.   Neurological:      General: No focal deficit present.      Mental Status: She is alert and oriented to person, place, and time.   Psychiatric:         Behavior: Behavior normal.       Assessment/Plan   Problem List Items Addressed This Visit             ICD-10-CM    Gingivitis, acute, plaque induced - Primary K05.00     -She has been prescribed clindamycin twice daily as directed and she is encouraged to take it on time as directed and to completion  -She will try the chlorhexidine oral rinse as directed  -She will call her dentist to see if she can get in sooner  -She is strongly encouraged to keep her original appointment no matter what         Relevant Medications    clindamycin (Cleocin) 300 mg capsule    chlorhexidine (Peridex) 0.12 % solution   Patient instructions  As we discussed because of your penicillin allergy we need to use clindamycin and then giving you a prescription for twice daily dosing for the next 10 days.  Please take it as directed on time and please call if you are not tolerating the antibiotic.  Please keep in mind it can take several days for the antibiotic to start to work.  If you develop diarrhea please call  I also gave you an oral rinse called chlorhexidine and please use this as directed.  This has antibiotic properties that can hopefully work topically on your gums and teeth  Please call your dentist about getting in sooner than January as I think you need to have treatment long before January.  They might have an opening to get you in sooner  Please give us an update  Please remember to make an appoint with her gynecologist for thorough exam including Pap and keep in mind that you need to have a Pap smear once a year unless otherwise indicated by your gynecologist  We can see you back to go over health concerns in the future and simply make an appointment        Mary Rosenberg, DO

## 2024-11-21 ENCOUNTER — TELEPHONE (OUTPATIENT)
Age: 39
End: 2024-11-21
Payer: COMMERCIAL

## 2024-11-21 DIAGNOSIS — R09.82 POST-NASAL DRIP: ICD-10-CM

## 2024-11-21 RX ORDER — FLUTICASONE PROPIONATE 50 MCG
2 SPRAY, SUSPENSION (ML) NASAL DAILY
Qty: 48 ML | Refills: 3 | Status: SHIPPED | OUTPATIENT
Start: 2024-11-21

## 2024-11-26 DIAGNOSIS — U07.1 COVID-19: ICD-10-CM

## 2024-11-26 DIAGNOSIS — R06.02 SHORTNESS OF BREATH: ICD-10-CM

## 2024-11-26 RX ORDER — ALBUTEROL SULFATE 0.63 MG/3ML
0.63 SOLUTION RESPIRATORY (INHALATION) EVERY 4 HOURS PRN
Qty: 75 ML | Refills: 1 | Status: SHIPPED | OUTPATIENT
Start: 2024-11-26 | End: 2024-12-26

## 2024-12-26 DIAGNOSIS — I10 HTN (HYPERTENSION), BENIGN: ICD-10-CM

## 2024-12-26 DIAGNOSIS — Z00.00 ENCOUNTER FOR GENERAL ADULT MEDICAL EXAMINATION WITHOUT ABNORMAL FINDINGS: ICD-10-CM

## 2024-12-26 DIAGNOSIS — E83.51 HYPOCALCEMIA: ICD-10-CM

## 2024-12-26 RX ORDER — METOPROLOL SUCCINATE 50 MG/1
50 TABLET, EXTENDED RELEASE ORAL DAILY
Qty: 90 TABLET | Refills: 1 | Status: SHIPPED | OUTPATIENT
Start: 2024-12-26 | End: 2025-06-24

## 2024-12-26 RX ORDER — PSYLLIUM HUSK 0.4 G
1 CAPSULE ORAL 2 TIMES DAILY
Qty: 60 TABLET | Refills: 5 | Status: SHIPPED | OUTPATIENT
Start: 2024-12-26

## 2024-12-26 RX ORDER — POTASSIUM CHLORIDE 750 MG/1
10 CAPSULE, EXTENDED RELEASE ORAL DAILY
Qty: 90 CAPSULE | Refills: 1 | Status: SHIPPED | OUTPATIENT
Start: 2024-12-26

## 2024-12-27 ENCOUNTER — TELEPHONE (OUTPATIENT)
Age: 39
End: 2024-12-27
Payer: COMMERCIAL

## 2024-12-27 NOTE — TELEPHONE ENCOUNTER
Please advise that it looks like we have never evaluated her for that condition or prescribed Valtrex.  I went back in the computer for 3 years and I do not see any prescription for Valtrex.  Please tell her that she would need to be evaluated for her condition as this would constitute a new condition for us and not a simple refill.  Has she received Valtrex from another provider like for example her gynecologist?  She could go to acute care or I would be very happy to see her next week when I get back in the office.  Please advise that we are not trying to be difficult but we have to see patients for new conditions that have not been evaluated before in the office.  I hope she understands and again she does have the option to going to acute care.  Thank you

## 2025-01-03 ENCOUNTER — TELEPHONE (OUTPATIENT)
Age: 40
End: 2025-01-03
Payer: COMMERCIAL

## 2025-01-03 NOTE — TELEPHONE ENCOUNTER
Patient left voicemail asking if PCP thinks it is okay for her to take the Milk Thistle supplement. Please advise, thank you.

## 2025-01-03 NOTE — TELEPHONE ENCOUNTER
Can use as directed but if she develops any side effects I would recommend discontinuation.  Thank you

## 2025-01-21 ENCOUNTER — APPOINTMENT (OUTPATIENT)
Age: 40
End: 2025-01-21
Payer: COMMERCIAL

## 2025-01-23 ENCOUNTER — LAB (OUTPATIENT)
Dept: LAB | Facility: LAB | Age: 40
End: 2025-01-23
Payer: COMMERCIAL

## 2025-01-23 DIAGNOSIS — E03.8 HYPOTHYROIDISM, SECONDARY: ICD-10-CM

## 2025-01-23 LAB — TSH SERPL-ACNC: 2.48 MIU/L (ref 0.44–3.98)

## 2025-01-23 PROCEDURE — 84443 ASSAY THYROID STIM HORMONE: CPT

## 2025-01-31 ENCOUNTER — APPOINTMENT (OUTPATIENT)
Age: 40
End: 2025-01-31
Payer: COMMERCIAL

## 2025-02-12 ENCOUNTER — APPOINTMENT (OUTPATIENT)
Age: 40
End: 2025-02-12
Payer: COMMERCIAL

## 2025-02-20 DIAGNOSIS — E03.8 HYPOTHYROIDISM, SECONDARY: ICD-10-CM

## 2025-02-25 ENCOUNTER — APPOINTMENT (OUTPATIENT)
Age: 40
End: 2025-02-25
Payer: COMMERCIAL

## 2025-02-25 ENCOUNTER — APPOINTMENT (OUTPATIENT)
Dept: RADIOLOGY | Facility: HOSPITAL | Age: 40
End: 2025-02-25
Payer: COMMERCIAL

## 2025-02-25 ENCOUNTER — HOSPITAL ENCOUNTER (EMERGENCY)
Facility: HOSPITAL | Age: 40
Discharge: HOME | End: 2025-02-25
Attending: EMERGENCY MEDICINE
Payer: COMMERCIAL

## 2025-02-25 ENCOUNTER — HOSPITAL ENCOUNTER (OUTPATIENT)
Dept: CARDIOLOGY | Facility: HOSPITAL | Age: 40
Discharge: HOME | End: 2025-02-25
Payer: COMMERCIAL

## 2025-02-25 VITALS
OXYGEN SATURATION: 98 % | HEART RATE: 146 BPM | SYSTOLIC BLOOD PRESSURE: 138 MMHG | DIASTOLIC BLOOD PRESSURE: 88 MMHG | BODY MASS INDEX: 37.74 KG/M2 | WEIGHT: 199.9 LBS | HEIGHT: 61 IN

## 2025-02-25 VITALS
HEIGHT: 61 IN | SYSTOLIC BLOOD PRESSURE: 128 MMHG | TEMPERATURE: 98.8 F | WEIGHT: 199 LBS | DIASTOLIC BLOOD PRESSURE: 90 MMHG | BODY MASS INDEX: 37.57 KG/M2 | RESPIRATION RATE: 16 BRPM | OXYGEN SATURATION: 96 % | HEART RATE: 102 BPM

## 2025-02-25 DIAGNOSIS — I27.29 OTHER SECONDARY PULMONARY HYPERTENSION: ICD-10-CM

## 2025-02-25 DIAGNOSIS — Q93.81 22Q11.2 DELETION SYNDROME (HHS-HCC): Primary | ICD-10-CM

## 2025-02-25 DIAGNOSIS — D82.1 DIGEORGE'S SYNDROME (MULTI): ICD-10-CM

## 2025-02-25 DIAGNOSIS — Z00.00 ENCOUNTER FOR GENERAL ADULT MEDICAL EXAMINATION WITHOUT ABNORMAL FINDINGS: ICD-10-CM

## 2025-02-25 DIAGNOSIS — L02.91 ABSCESS: ICD-10-CM

## 2025-02-25 DIAGNOSIS — G40.909 SEIZURE DISORDER (MULTI): ICD-10-CM

## 2025-02-25 DIAGNOSIS — L30.9 DERMATITIS: ICD-10-CM

## 2025-02-25 DIAGNOSIS — I10 HTN (HYPERTENSION), BENIGN: ICD-10-CM

## 2025-02-25 DIAGNOSIS — I45.81 PROLONGED QT SYNDROME: ICD-10-CM

## 2025-02-25 DIAGNOSIS — R09.82 POST-NASAL DRIP: ICD-10-CM

## 2025-02-25 DIAGNOSIS — E20.9 HYPOPARATHYROIDISM, UNSPECIFIED HYPOPARATHYROIDISM TYPE (MULTI): ICD-10-CM

## 2025-02-25 DIAGNOSIS — E61.2 MAGNESIUM DEFICIENCY: ICD-10-CM

## 2025-02-25 DIAGNOSIS — R00.0 TACHYCARDIA: Primary | ICD-10-CM

## 2025-02-25 DIAGNOSIS — E55.9 VITAMIN D DEFICIENCY: ICD-10-CM

## 2025-02-25 DIAGNOSIS — R00.0 TACHYCARDIA: ICD-10-CM

## 2025-02-25 DIAGNOSIS — E83.51 HYPOCALCEMIA: ICD-10-CM

## 2025-02-25 DIAGNOSIS — E03.8 HYPOTHYROIDISM, SECONDARY: ICD-10-CM

## 2025-02-25 DIAGNOSIS — J45.40 MODERATE PERSISTENT ASTHMA WITHOUT COMPLICATION (HHS-HCC): ICD-10-CM

## 2025-02-25 PROBLEM — Z95.810 CARDIAC DEFIBRILLATOR IN PLACE: Status: RESOLVED | Noted: 2023-05-30 | Resolved: 2025-02-25

## 2025-02-25 PROBLEM — R10.2 PELVIC PAIN: Status: RESOLVED | Noted: 2024-07-23 | Resolved: 2025-02-25

## 2025-02-25 LAB
ALBUMIN SERPL BCP-MCNC: 5.1 G/DL (ref 3.4–5)
ALP SERPL-CCNC: 69 U/L (ref 33–110)
ALT SERPL W P-5'-P-CCNC: 26 U/L (ref 7–45)
ANION GAP SERPL CALC-SCNC: 20 MMOL/L (ref 10–20)
APPEARANCE UR: CLEAR
AST SERPL W P-5'-P-CCNC: 30 U/L (ref 9–39)
BASOPHILS # BLD AUTO: 0.07 X10*3/UL (ref 0–0.1)
BASOPHILS NFR BLD AUTO: 0.7 %
BILIRUB SERPL-MCNC: 0.5 MG/DL (ref 0–1.2)
BILIRUB UR STRIP.AUTO-MCNC: NEGATIVE MG/DL
BUN SERPL-MCNC: 15 MG/DL (ref 6–23)
CALCIUM SERPL-MCNC: 7.8 MG/DL (ref 8.6–10.3)
CARDIAC TROPONIN I PNL SERPL HS: <3 NG/L (ref 0–13)
CARDIAC TROPONIN I PNL SERPL HS: <3 NG/L (ref 0–13)
CHLORIDE SERPL-SCNC: 99 MMOL/L (ref 98–107)
CO2 SERPL-SCNC: 22 MMOL/L (ref 21–32)
COLOR UR: NORMAL
CREAT SERPL-MCNC: 0.93 MG/DL (ref 0.5–1.05)
D DIMER PPP FEU-MCNC: 422 NG/ML FEU
EGFRCR SERPLBLD CKD-EPI 2021: 79 ML/MIN/1.73M*2
EOSINOPHIL # BLD AUTO: 0.2 X10*3/UL (ref 0–0.7)
EOSINOPHIL NFR BLD AUTO: 2 %
ERYTHROCYTE [DISTWIDTH] IN BLOOD BY AUTOMATED COUNT: 13.1 % (ref 11.5–14.5)
FLUAV RNA RESP QL NAA+PROBE: NOT DETECTED
FLUBV RNA RESP QL NAA+PROBE: NOT DETECTED
GLUCOSE SERPL-MCNC: 99 MG/DL (ref 74–99)
GLUCOSE UR STRIP.AUTO-MCNC: NORMAL MG/DL
HCT VFR BLD AUTO: 45.1 % (ref 36–46)
HGB BLD-MCNC: 14.5 G/DL (ref 12–16)
IMM GRANULOCYTES # BLD AUTO: 0.04 X10*3/UL (ref 0–0.7)
IMM GRANULOCYTES NFR BLD AUTO: 0.4 % (ref 0–0.9)
KETONES UR STRIP.AUTO-MCNC: NEGATIVE MG/DL
LACTATE SERPL-SCNC: 1.3 MMOL/L (ref 0.4–2)
LEUKOCYTE ESTERASE UR QL STRIP.AUTO: NEGATIVE
LYMPHOCYTES # BLD AUTO: 2.3 X10*3/UL (ref 1.2–4.8)
LYMPHOCYTES NFR BLD AUTO: 22.5 %
MAGNESIUM SERPL-MCNC: 1.61 MG/DL (ref 1.6–2.4)
MCH RBC QN AUTO: 28.4 PG (ref 26–34)
MCHC RBC AUTO-ENTMCNC: 32.2 G/DL (ref 32–36)
MCV RBC AUTO: 88 FL (ref 80–100)
MONOCYTES # BLD AUTO: 0.37 X10*3/UL (ref 0.1–1)
MONOCYTES NFR BLD AUTO: 3.6 %
NEUTROPHILS # BLD AUTO: 7.25 X10*3/UL (ref 1.2–7.7)
NEUTROPHILS NFR BLD AUTO: 70.8 %
NITRITE UR QL STRIP.AUTO: NEGATIVE
NRBC BLD-RTO: 0 /100 WBCS (ref 0–0)
PH UR STRIP.AUTO: 6.5 [PH]
PLATELET # BLD AUTO: 228 X10*3/UL (ref 150–450)
POTASSIUM SERPL-SCNC: 3.6 MMOL/L (ref 3.5–5.3)
PROT SERPL-MCNC: 9.4 G/DL (ref 6.4–8.2)
PROT UR STRIP.AUTO-MCNC: NEGATIVE MG/DL
RBC # BLD AUTO: 5.11 X10*6/UL (ref 4–5.2)
RBC # UR STRIP.AUTO: NEGATIVE MG/DL
RSV RNA RESP QL NAA+PROBE: NOT DETECTED
SARS-COV-2 RNA RESP QL NAA+PROBE: NOT DETECTED
SODIUM SERPL-SCNC: 137 MMOL/L (ref 136–145)
SP GR UR STRIP.AUTO: 1.01
TSH SERPL-ACNC: 1.73 MIU/L (ref 0.44–3.98)
UROBILINOGEN UR STRIP.AUTO-MCNC: NORMAL MG/DL
WBC # BLD AUTO: 10.2 X10*3/UL (ref 4.4–11.3)

## 2025-02-25 PROCEDURE — 71045 X-RAY EXAM CHEST 1 VIEW: CPT | Mod: FOREIGN READ | Performed by: RADIOLOGY

## 2025-02-25 PROCEDURE — 85025 COMPLETE CBC W/AUTO DIFF WBC: CPT | Performed by: EMERGENCY MEDICINE

## 2025-02-25 PROCEDURE — 3075F SYST BP GE 130 - 139MM HG: CPT | Performed by: INTERNAL MEDICINE

## 2025-02-25 PROCEDURE — 36415 COLL VENOUS BLD VENIPUNCTURE: CPT | Performed by: EMERGENCY MEDICINE

## 2025-02-25 PROCEDURE — 96360 HYDRATION IV INFUSION INIT: CPT

## 2025-02-25 PROCEDURE — 84443 ASSAY THYROID STIM HORMONE: CPT | Performed by: EMERGENCY MEDICINE

## 2025-02-25 PROCEDURE — 87637 SARSCOV2&INF A&B&RSV AMP PRB: CPT | Performed by: EMERGENCY MEDICINE

## 2025-02-25 PROCEDURE — 93005 ELECTROCARDIOGRAM TRACING: CPT

## 2025-02-25 PROCEDURE — 71045 X-RAY EXAM CHEST 1 VIEW: CPT

## 2025-02-25 PROCEDURE — 83605 ASSAY OF LACTIC ACID: CPT | Performed by: EMERGENCY MEDICINE

## 2025-02-25 PROCEDURE — 99285 EMERGENCY DEPT VISIT HI MDM: CPT | Mod: 25

## 2025-02-25 PROCEDURE — 85379 FIBRIN DEGRADATION QUANT: CPT | Performed by: EMERGENCY MEDICINE

## 2025-02-25 PROCEDURE — 1036F TOBACCO NON-USER: CPT | Performed by: INTERNAL MEDICINE

## 2025-02-25 PROCEDURE — 99284 EMERGENCY DEPT VISIT MOD MDM: CPT | Mod: 25 | Performed by: EMERGENCY MEDICINE

## 2025-02-25 PROCEDURE — 84484 ASSAY OF TROPONIN QUANT: CPT | Performed by: EMERGENCY MEDICINE

## 2025-02-25 PROCEDURE — 3008F BODY MASS INDEX DOCD: CPT | Performed by: INTERNAL MEDICINE

## 2025-02-25 PROCEDURE — 83735 ASSAY OF MAGNESIUM: CPT | Performed by: EMERGENCY MEDICINE

## 2025-02-25 PROCEDURE — 99213 OFFICE O/P EST LOW 20 MIN: CPT | Performed by: INTERNAL MEDICINE

## 2025-02-25 PROCEDURE — 87635 SARS-COV-2 COVID-19 AMP PRB: CPT | Performed by: EMERGENCY MEDICINE

## 2025-02-25 PROCEDURE — 80053 COMPREHEN METABOLIC PANEL: CPT | Performed by: EMERGENCY MEDICINE

## 2025-02-25 PROCEDURE — 2500000002 HC RX 250 W HCPCS SELF ADMINISTERED DRUGS (ALT 637 FOR MEDICARE OP, ALT 636 FOR OP/ED): Performed by: EMERGENCY MEDICINE

## 2025-02-25 PROCEDURE — 81003 URINALYSIS AUTO W/O SCOPE: CPT | Performed by: EMERGENCY MEDICINE

## 2025-02-25 PROCEDURE — 2500000004 HC RX 250 GENERAL PHARMACY W/ HCPCS (ALT 636 FOR OP/ED): Performed by: EMERGENCY MEDICINE

## 2025-02-25 PROCEDURE — 3079F DIAST BP 80-89 MM HG: CPT | Performed by: INTERNAL MEDICINE

## 2025-02-25 RX ORDER — FLUTICASONE PROPIONATE 50 MCG
2 SPRAY, SUSPENSION (ML) NASAL DAILY
Qty: 48 ML | Refills: 3 | Status: SHIPPED | OUTPATIENT
Start: 2025-02-25

## 2025-02-25 RX ORDER — MAGNESIUM CHLORIDE 64 MG
64 TABLET, DELAYED RELEASE (ENTERIC COATED) ORAL DAILY
Qty: 30 TABLET | Refills: 5 | Status: SHIPPED | OUTPATIENT
Start: 2025-02-25

## 2025-02-25 RX ORDER — ERGOCALCIFEROL 1.25 MG/1
1 CAPSULE ORAL
Qty: 4 CAPSULE | Refills: 5 | Status: SHIPPED | OUTPATIENT
Start: 2025-02-25

## 2025-02-25 RX ORDER — CLOTRIMAZOLE AND BETAMETHASONE DIPROPIONATE 10; .64 MG/G; MG/G
1 CREAM TOPICAL 2 TIMES DAILY
Qty: 15 G | Refills: 5 | Status: SHIPPED | OUTPATIENT
Start: 2025-02-25

## 2025-02-25 RX ORDER — LEVOTHYROXINE SODIUM 300 UG/1
TABLET ORAL
Qty: 90 TABLET | Refills: 1 | Status: SHIPPED | OUTPATIENT
Start: 2025-02-25

## 2025-02-25 RX ORDER — METOPROLOL SUCCINATE 25 MG/1
100 TABLET, EXTENDED RELEASE ORAL DAILY
Qty: 120 TABLET | Refills: 0 | Status: SHIPPED | OUTPATIENT
Start: 2025-02-25 | End: 2025-02-28 | Stop reason: SDUPTHER

## 2025-02-25 RX ORDER — POTASSIUM CHLORIDE 750 MG/1
10 CAPSULE, EXTENDED RELEASE ORAL DAILY
Qty: 90 CAPSULE | Refills: 1 | Status: SHIPPED | OUTPATIENT
Start: 2025-02-25

## 2025-02-25 RX ORDER — METOPROLOL SUCCINATE 50 MG/1
50 TABLET, EXTENDED RELEASE ORAL DAILY
Qty: 90 TABLET | Refills: 1 | Status: SHIPPED | OUTPATIENT
Start: 2025-02-25 | End: 2025-02-25 | Stop reason: WASHOUT

## 2025-02-25 RX ORDER — SULFAMETHOXAZOLE AND TRIMETHOPRIM 800; 160 MG/1; MG/1
1 TABLET ORAL ONCE
Status: COMPLETED | OUTPATIENT
Start: 2025-02-25 | End: 2025-02-25

## 2025-02-25 RX ORDER — CALCITRIOL 0.5 UG/1
0.5 CAPSULE ORAL DAILY
Qty: 90 CAPSULE | Refills: 1 | Status: SHIPPED | OUTPATIENT
Start: 2025-02-25

## 2025-02-25 RX ORDER — SULFAMETHOXAZOLE AND TRIMETHOPRIM 800; 160 MG/1; MG/1
1 TABLET ORAL EVERY 12 HOURS
Qty: 20 TABLET | Refills: 0 | Status: SHIPPED | OUTPATIENT
Start: 2025-02-25 | End: 2025-02-28 | Stop reason: SINTOL

## 2025-02-25 RX ORDER — PSYLLIUM HUSK 0.4 G
1 CAPSULE ORAL 2 TIMES DAILY
Qty: 60 TABLET | Refills: 5 | Status: SHIPPED | OUTPATIENT
Start: 2025-02-25

## 2025-02-25 RX ADMIN — SULFAMETHOXAZOLE AND TRIMETHOPRIM 1 TABLET: 800; 160 TABLET ORAL at 19:57

## 2025-02-25 RX ADMIN — SODIUM CHLORIDE 1000 ML: 0.9 INJECTION, SOLUTION INTRAVENOUS at 18:29

## 2025-02-25 ASSESSMENT — ENCOUNTER SYMPTOMS
WHEEZING: 0
NAUSEA: 0
SHORTNESS OF BREATH: 1
VOMITING: 0
BACK PAIN: 0
CHEST TIGHTNESS: 0
PALPITATIONS: 1
COUGH: 0
ARTHRALGIAS: 0
DIARRHEA: 0
FATIGUE: 1

## 2025-02-25 ASSESSMENT — COLUMBIA-SUICIDE SEVERITY RATING SCALE - C-SSRS
6. HAVE YOU EVER DONE ANYTHING, STARTED TO DO ANYTHING, OR PREPARED TO DO ANYTHING TO END YOUR LIFE?: NO
2. HAVE YOU ACTUALLY HAD ANY THOUGHTS OF KILLING YOURSELF?: NO
1. IN THE PAST MONTH, HAVE YOU WISHED YOU WERE DEAD OR WISHED YOU COULD GO TO SLEEP AND NOT WAKE UP?: NO

## 2025-02-25 ASSESSMENT — PAIN - FUNCTIONAL ASSESSMENT: PAIN_FUNCTIONAL_ASSESSMENT: 0-10

## 2025-02-25 ASSESSMENT — VISUAL ACUITY: OU: 1

## 2025-02-25 ASSESSMENT — PAIN SCALES - GENERAL: PAINLEVEL_OUTOF10: 0 - NO PAIN

## 2025-02-25 NOTE — PATIENT INSTRUCTIONS
Patient instructions  As we discussed we have no other choice than to send you to the emergency department because of your tachycardia.  While sitting at rest your heart rate was still running in the 140s and because of your cardiac history we want you to be thoroughly examined in the emergency department.  Because you do not have transportation we will have the squad take you.  I will call ahead to the emergency room to explain why you are coming.  I will need to see you in follow up after you get on the emergency room for a hospital

## 2025-02-25 NOTE — ASSESSMENT & PLAN NOTE
-She has a history of prolonged QT syndrome and cardiac dysrhythmias.  -She is tachycardic today so I am going to send to the emergency room

## 2025-02-25 NOTE — ASSESSMENT & PLAN NOTE
-She has had issues with maintaining magnesium levels and we are providing a refill on her prescription today

## 2025-02-25 NOTE — PROGRESS NOTES
Subjective   Patient ID: Bety Ibrahim is a 39 y.o. female who presents for Follow-up (6 MO CK).  HPI  She is here today for routine checkup.  When she arrived we took her vital signs and her heart rate was in the high 140s.  She states that she has not been feeling well although to look at her she does not appear toxic.  We kept checking her pulse oximeter and her heart rate was really fast.  She states she takes her beta-blockers regularly without fail.  She denies any recent high consumption of caffeine.  She denies any recent use of inhalers.  She states she does not smoke anymore and she does not use any recreational drugs.  We are reminded that she has had a cardiac history and used to have an implantable automatic defibrillator.  She states is no longer present.  She states she was placed on this before because of racing heart.  I told her that I wanted to play it safe and we would have her go to the emergency room.  She was brought by a transport company called [employer] and our policy is not to transport for emergent patients.  She has no other forms of transportation and therefore we will contact the squad.  I will also call ahead and let the emergency room know what I am sending her.  I have also agreed to give her refills on some of her basic medications.  I will summarize everything in a problem based format.  Review of Systems   Constitutional:  Positive for fatigue.   Respiratory:  Positive for shortness of breath. Negative for cough, chest tightness and wheezing.    Cardiovascular:  Positive for palpitations. Negative for chest pain and leg swelling.   Gastrointestinal:  Negative for diarrhea, nausea and vomiting.   Musculoskeletal:  Negative for arthralgias and back pain.     Objective   Physical Exam  Vitals and nursing note reviewed.   Constitutional:       General: She is not in acute distress.     Appearance: Normal appearance.   HENT:      Head: Normocephalic and atraumatic.   Eyes:       Conjunctiva/sclera: Conjunctivae normal.   Cardiovascular:      Rate and Rhythm: Normal rate and regular rhythm.      Heart sounds: Normal heart sounds.   Pulmonary:      Effort: No respiratory distress.      Breath sounds: No wheezing.   Abdominal:      Palpations: Abdomen is soft.      Tenderness: There is no abdominal tenderness. There is no guarding.   Musculoskeletal:         General: No swelling. Normal range of motion.   Skin:     General: Skin is warm and dry.   Neurological:      General: No focal deficit present.      Mental Status: She is alert and oriented to person, place, and time.   Psychiatric:         Behavior: Behavior normal.         Assessment/Plan   Problem List Items Addressed This Visit             ICD-10-CM    HTN (hypertension), benign I10     -Blood pressure is adequate although a little bit on the higher side today and we will continue to monitor         Relevant Medications    metoprolol succinate XL (Toprol-XL) 50 mg 24 hr tablet    Hypocalcemia E83.51    Relevant Medications    calcium carbonate-vitamin D3 500 mg-5 mcg (200 unit) tablet    Hypothyroidism, secondary E03.8     -Her most recent TSH was within acceptable range we will continue to monitor         Relevant Medications    levothyroxine (Synthroid, Unithroid) 300 mcg tablet    Moderate persistent asthma J45.40     -Relatively stable in the recent past and she no longer smokes  -We will continue to monitor         Post-nasal drip R09.82    Relevant Medications    fluticasone (Flonase) 50 mcg/actuation nasal spray    Prolonged QT syndrome I45.81     -She has a history of prolonged QT syndrome and cardiac dysrhythmias.  -She is tachycardic today so I am going to send to the emergency room         Relevant Medications    metoprolol succinate XL (Toprol-XL) 50 mg 24 hr tablet    Seizure disorder (Multi) G40.909     -She has had no seizures in the recent past and we will continue to monitor         Vitamin D deficiency E55.9     Relevant Medications    calcitriol (Rocaltrol) 0.5 mcg capsule    ergocalciferol (Vitamin D-2) 1250 mcg (50,000 units) capsule    DiGeorge's syndrome (Multi) - Primary D82.1    Hypoparathyroidism, unspecified hypoparathyroidism type (Multi) E20.9     -She has been instructed to see endocrinology for her chronic condition         Tachycardia R00.0     -Her heart rate is running in the high 140s despite even sitting for a while and therefore I am sending her to the emergency room  -She is otherwise very stable from a cardiovascular standpoint         Magnesium deficiency E61.2     -She has had issues with maintaining magnesium levels and we are providing a refill on her prescription today         Relevant Medications    magnesium chloride (Mag 64) 64 mg EC tablet    Dermatitis L30.9    Relevant Medications    clotrimazole-betamethasone (Lotrisone) cream     Other Visit Diagnoses         Codes    Encounter for general adult medical examination without abnormal findings     Z00.00    Relevant Medications    potassium chloride ER (Micro-K) 10 mEq ER capsule    Other secondary pulmonary hypertension     I27.29        Patient instructions  As we discussed we have no other choice than to send you to the emergency department because of your tachycardia.  While sitting at rest your heart rate was still running in the 140s and because of your cardiac history we want you to be thoroughly examined in the emergency department.  Because you do not have transportation we will have the squad take you.  I will call ahead to the emergency room to explain why you are coming.  I will need to see you in follow up after you get on the emergency room for [facility], DO

## 2025-02-25 NOTE — ED PROVIDER NOTES
Increased heart rate.  This 39-year-old white female presents to the ED after being seen by her primary care doctor today due to an elevated heart rate.  Patient states that she has a history of a defibrillator as a child though she is not really sure why she had that.  She does have a history of thyroid disease..  She denies any recent illness cough congestion fever chills or other systemic symptoms.  She states that she was seeing her doctor for follow-up and states that she does see Dr. Zepeda as a cardiologist.  She states that she is allergic to penicillin and occasionally uses alcohol.           Physical Exam  Vitals and nursing note reviewed.   Constitutional:       General: She is awake.      Appearance: Normal appearance. She is overweight.   HENT:      Head: Normocephalic and atraumatic.      Right Ear: Hearing and external ear normal.      Left Ear: Hearing and external ear normal.      Nose: Nose normal. No congestion or rhinorrhea.      Mouth/Throat:      Lips: Pink.      Mouth: Mucous membranes are moist.      Pharynx: Oropharynx is clear. Uvula midline. No oropharyngeal exudate or posterior oropharyngeal erythema.   Eyes:      General: Lids are normal. Vision grossly intact.         Right eye: No discharge.         Left eye: No discharge.      Extraocular Movements: Extraocular movements intact.      Conjunctiva/sclera: Conjunctivae normal.      Pupils: Pupils are equal, round, and reactive to light.   Cardiovascular:      Rate and Rhythm: Regular rhythm. Tachycardia present.      Pulses: Normal pulses.      Heart sounds: Normal heart sounds. No murmur heard.     No friction rub. No gallop.   Pulmonary:      Effort: Pulmonary effort is normal. No respiratory distress.      Breath sounds: Normal breath sounds. No stridor. No wheezing, rhonchi or rales.   Chest:      Chest wall: No tenderness.   Abdominal:      General: Abdomen is protuberant. Bowel sounds are normal. There is no distension.       Palpations: Abdomen is soft. There is no mass.      Tenderness: There is no abdominal tenderness. There is no guarding or rebound.      Hernia: No hernia is present.      Comments: Patient has benign abdominal exam.   Musculoskeletal:         General: No swelling, tenderness, deformity or signs of injury. Normal range of motion.      Cervical back: Full passive range of motion without pain, normal range of motion and neck supple.      Right lower leg: Normal. No edema.      Left lower leg: Normal. No edema.   Skin:     General: Skin is warm and dry.      Capillary Refill: Capillary refill takes less than 2 seconds.      Coloration: Skin is not jaundiced or pale.      Findings: No bruising, erythema, lesion or rash.   Neurological:      General: No focal deficit present.      Mental Status: She is alert and oriented to person, place, and time.      GCS: GCS eye subscore is 4. GCS verbal subscore is 5. GCS motor subscore is 6.      Cranial Nerves: Cranial nerves 2-12 are intact. No cranial nerve deficit.      Sensory: Sensation is intact. No sensory deficit.      Motor: Motor function is intact. No weakness.      Coordination: Coordination is intact. Coordination normal.      Deep Tendon Reflexes: Reflexes normal.   Psychiatric:         Attention and Perception: Attention and perception normal.         Mood and Affect: Mood and affect normal.         Speech: Speech normal.         Behavior: Behavior normal. Behavior is cooperative.         Thought Content: Thought content normal.         Cognition and Memory: Memory normal.         Judgment: Judgment normal.          Labs Reviewed   COMPREHENSIVE METABOLIC PANEL - Abnormal       Result Value    Glucose 99      Sodium 137      Potassium 3.6      Chloride 99      Bicarbonate 22      Anion Gap 20      Urea Nitrogen 15      Creatinine 0.93      eGFR 79      Calcium 7.8 (*)     Albumin 5.1 (*)     Alkaline Phosphatase 69      Total Protein 9.4 (*)     AST 30       Bilirubin, Total 0.5      ALT 26     LACTATE - Normal    Lactate 1.3      Narrative:     Venipuncture immediately after or during the administration of Metamizole may lead to falsely low results. Testing should be performed immediately prior to Metamizole dosing.   SARS-COV-2 PCR - Normal    Coronavirus 2019, PCR Not Detected      Narrative:     This assay is an FDA-cleared, in vitro diagnostic nucleic acid amplification test for the qualitative detection and differentiation of SARS CoV-2 from nasopharyngeal specimens collected from individuals with signs and symptoms of respiratory tract infections, and has been validated for use at [facility]. Negative results do not preclude COVID-19 infections and should not be used as the sole basis for diagnosis, treatment, or other management decisions. Testing for SARS CoV-2 is recommended only for patients who meet current clinical and/or epidemiological criteria defined by federal, state, or local public health directives.   RSV PCR - Normal    RSV PCR Not Detected      Narrative:     This assay is an FDA-cleared, in vitro diagnostic nucleic acid amplification test for the detection of RSV from nasopharyngeal specimens, and has been validated for use at [facility]. Negative results do not preclude RSV infections, and should not be used as the sole basis for diagnosis, treatment, or other management decisions. If Influenza A/B and RSV PCR results are negative, testing for Parainfluenza virus, Adenovirus and Metapneumovirus is routinely performed for pediatric oncology and intensive care inpatients at [facility], and is available on other patients by placing an add-on request.       INFLUENZA A AND B PCR - Normal    Flu A Result Not Detected      Flu B Result Not Detected      Narrative:     This assay is an in vitro diagnostic multiplex nucleic acid amplification test for the detection and discrimination of Influenza A & B from  nasopharyngeal specimens, and has been validated for use at [facility]. Negative results do not preclude Influenza A/B infections, and should not be used as the sole basis for diagnosis, treatment, or other management decisions. If Influenza A/B and RSV PCR results are negative, testing for Parainfluenza virus, Adenovirus and Metapneumovirus is routinely performed for [facility] pediatric oncology and intensive care inpatients, and is available on other patients by placing an add-on request.   D-DIMER, VTE EXCLUSION - Normal    D-Dimer, Quantitative VTE Exclusion 422      Narrative:     The VTE Exclusion D-Dimer assay is reported in ng/mL Fibrinogen Equivalent Units (FEU).    Per 's instructions for use, a value of less than 500 ng/mL (FEU) may help to exclude DVT or PE in outpatients when the assay is used with a clinical pretest probability assessment.([facility] must utilize and document eCalc 'Wells Score Deep Vein Thrombosis Risk' for DVT exclusion only. Emergency Department should utilize  Guidelines for Emergency Department Use of the VTE Exclusion D-Dimer and Clinical Pretest probability assessment model for DVT or PE exclusion.)   URINALYSIS WITH REFLEX CULTURE AND MICROSCOPIC - Normal    Color, Urine Light-Yellow      Appearance, Urine Clear      Specific Gravity, Urine 1.009      pH, Urine 6.5      Protein, Urine NEGATIVE      Glucose, Urine Normal      Blood, Urine NEGATIVE      Ketones, Urine NEGATIVE      Bilirubin, Urine NEGATIVE      Urobilinogen, Urine Normal      Nitrite, Urine NEGATIVE      Leukocyte Esterase, Urine NEGATIVE     SERIAL TROPONIN-INITIAL - Normal    Troponin I, High Sensitivity <3      Narrative:     Less than 99th percentile of normal range cutoff-  Female and children under 18 years old <14 ng/L; Male <21 ng/L: Negative  Repeat testing should be performed if clinically indicated.     Female and children under 18 years old 14-50 ng/L; Male 21-50  ng/L:  Consistent with possible cardiac damage and possible increased clinical   risk. Serial measurements may help to assess extent of myocardial damage.     >50 ng/L: Consistent with cardiac damage, increased clinical risk and  myocardial infarction. Serial measurements may help assess extent of   myocardial damage.      NOTE: Children less than 1 year old may have higher baseline troponin   levels and results should be interpreted in conjunction with the overall   clinical context.     NOTE: Troponin I testing is performed using a different   testing methodology at [facility] than at other   [facility]. Direct result comparisons should only   be made within the same method.   TSH - Normal    Thyroid Stimulating Hormone 1.73      Narrative:     TSH testing is performed using different testing methodology at [facility] than at other [facility]. Direct result comparisons should only be made within the same method.     MAGNESIUM - Normal    Magnesium 1.61     SERIAL TROPONIN, 1 HOUR - Normal    Troponin I, High Sensitivity <3      Narrative:     Less than 99th percentile of normal range cutoff-  Female and children under 18 years old <14 ng/L; Male <21 ng/L: Negative  Repeat testing should be performed if clinically indicated.     Female and children under 18 years old 14-50 ng/L; Male 21-50 ng/L:  Consistent with possible cardiac damage and possible increased clinical   risk. Serial measurements may help to assess extent of myocardial damage.     >50 ng/L: Consistent with cardiac damage, increased clinical risk and  myocardial infarction. Serial measurements may help assess extent of   myocardial damage.      NOTE: Children less than 1 year old may have higher baseline troponin   levels and results should be interpreted in conjunction with the overall   clinical context.     NOTE: Troponin I testing is performed using a different   testing methodology at [facility] than at other   [facility] hospitals. Direct result comparisons should only   be made within the same method.   CBC WITH AUTO DIFFERENTIAL    WBC 10.2      nRBC 0.0      RBC 5.11      Hemoglobin 14.5      Hematocrit 45.1      MCV 88      MCH 28.4      MCHC 32.2      RDW 13.1      Platelets 228      Neutrophils % 70.8      Immature Granulocytes %, Automated 0.4      Lymphocytes % 22.5      Monocytes % 3.6      Eosinophils % 2.0      Basophils % 0.7      Neutrophils Absolute 7.25      Immature Granulocytes Absolute, Automated 0.04      Lymphocytes Absolute 2.30      Monocytes Absolute 0.37      Eosinophils Absolute 0.20      Basophils Absolute 0.07     TROPONIN SERIES- (INITIAL, 1 HR)    Narrative:     The following orders were created for panel order Troponin I Series, High Sensitivity (0, 1 HR).  Procedure                               Abnormality         Status                     ---------                               -----------         ------                     Troponin I, High Sensiti...[[id number]]  Normal              Final result               Troponin, High Sensitivi...[[id number]]  Normal              Final result                 Please view results for these tests on the individual orders.   URINALYSIS WITH REFLEX CULTURE AND MICROSCOPIC    Narrative:     The following orders were created for panel order Urinalysis with Reflex Culture and Microscopic.  Procedure                               Abnormality         Status                     ---------                               -----------         ------                     Urinalysis with Reflex C...[[id number]]  Normal              Final result               Extra Urine Gray Tube[[id number]]                                                         Please view results for these tests on the individual orders.   EXTRA URINE GRAY TUBE        XR chest 1 view   Final Result   No acute abnormality.   Signed by Ronnell Gramajo MD           Procedures      Medical Decision Making  Patient was seen and evaluated due to complaints of tachycardia.  The patient was seen evaluated by her primary care doctor today and her heart rate is in the 130s.  The patient has underlying history of tachycardia and is on metoprolol 50 mg daily for that.  Patient states that she sees Dr. Stout.  Patient extensive workup performed with lab work including TSH and lab work urinalysis and x-ray of the chest were all unremarkable.  Patient continues to have sinus tach on her monitor and I contacted Dr. Zepeda to increase her medication from 50 mg to 100 mg.  I then had a detailed discussion with the patient concerning my discussion with Dr. Stout and plan of care including changing her prescription.    Amount and/or Complexity of Data Reviewed  ECG/medicine tests: independent interpretation performed.     Details: EKG was interpreted by myself at 3:32 PM reveals sinus tachycardia 107 bpm with no acute ST or T wave changes noted.  The heart rate is 107 bpm.  The GA interval is 126 ms.  The QRS durations 70 ms.  The QTc is 480 ms.  Axis is 51 degrees.         Diagnoses as of 02/25/25 1912   Tachycardia                    Isiah Curtis,   02/25/25 1912

## 2025-02-25 NOTE — ASSESSMENT & PLAN NOTE
-Blood pressure is adequate although a little bit on the higher side today and we will continue to monitor

## 2025-02-25 NOTE — ASSESSMENT & PLAN NOTE
-Her heart rate is running in the high 140s despite even sitting for a while and therefore I am sending her to the emergency room  -She is otherwise very stable from a cardiovascular standpoint

## 2025-02-26 LAB
ATRIAL RATE: 107 BPM
P AXIS: 43 DEGREES
P OFFSET: 202 MS
P ONSET: 159 MS
PR INTERVAL: 126 MS
Q ONSET: 222 MS
QRS COUNT: 17 BEATS
QRS DURATION: 70 MS
QT INTERVAL: 360 MS
QTC CALCULATION(BAZETT): 480 MS
QTC FREDERICIA: 436 MS
R AXIS: 51 DEGREES
T AXIS: 29 DEGREES
T OFFSET: 402 MS
VENTRICULAR RATE: 107 BPM

## 2025-02-26 NOTE — ED PROVIDER NOTES
Emergency Medicine Transition of Care Note.    I received Bety Ibrahim in signout from Dr. Curtis.  Please see the previous ED provider note for all HPI, PE and MDM up to the time of signout at 7 PM. This is in addition to the primary record.    In brief Bety Ibrahim is an 39 y.o. female presenting for   Chief Complaint   Patient presents with    Rapid Heart Rate     Patient was sent over from Dr. Rosenberg's office for tachycardia. Patient was there for med check up. Patient denies symptoms or complaints. EKG done at . Heart rate 108 on arrival.     At the time of signout we were awaiting:     Diagnoses as of 02/25/25 1947   Tachycardia   Abscess       Medical Decision Making  Patient was discharged by the previous provider however she had an abdominal lesion that she wanted looked at prior to leaving.  She does have a small nontender abscess/boil right lower abdomen.  Will give initial dose of Bactrim and a prescription was sent to her pharmacy.        Final diagnoses:   [R00.0] Tachycardia   [L02.91] Abscess           Procedure  Procedures    MD Sandip Sifuentes MD  02/25/25 1948

## 2025-02-28 ENCOUNTER — OFFICE VISIT (OUTPATIENT)
Dept: CARDIOLOGY | Facility: CLINIC | Age: 40
End: 2025-02-28
Payer: COMMERCIAL

## 2025-02-28 VITALS
DIASTOLIC BLOOD PRESSURE: 66 MMHG | HEART RATE: 78 BPM | BODY MASS INDEX: 37.6 KG/M2 | HEIGHT: 61 IN | OXYGEN SATURATION: 99 % | SYSTOLIC BLOOD PRESSURE: 106 MMHG

## 2025-02-28 DIAGNOSIS — R00.0 TACHYCARDIA: ICD-10-CM

## 2025-02-28 DIAGNOSIS — R94.31 QT PROLONGATION: Primary | ICD-10-CM

## 2025-02-28 DIAGNOSIS — E83.51 HYPOCALCEMIA: ICD-10-CM

## 2025-02-28 PROCEDURE — 93000 ELECTROCARDIOGRAM COMPLETE: CPT

## 2025-02-28 PROCEDURE — 3074F SYST BP LT 130 MM HG: CPT

## 2025-02-28 PROCEDURE — 99417 PROLNG OP E/M EACH 15 MIN: CPT

## 2025-02-28 PROCEDURE — 1036F TOBACCO NON-USER: CPT

## 2025-02-28 PROCEDURE — 99215 OFFICE O/P EST HI 40 MIN: CPT

## 2025-02-28 PROCEDURE — 3078F DIAST BP <80 MM HG: CPT

## 2025-02-28 RX ORDER — METOPROLOL SUCCINATE 100 MG/1
100 TABLET, EXTENDED RELEASE ORAL DAILY
Qty: 30 TABLET | Refills: 11 | Status: SHIPPED | OUTPATIENT
Start: 2025-02-28 | End: 2026-02-28

## 2025-02-28 RX ORDER — IBUPROFEN 600 MG/1
600 TABLET ORAL EVERY 6 HOURS PRN
COMMUNITY
Start: 2024-11-22

## 2025-02-28 NOTE — PROGRESS NOTES
[facility]  Cardiology Clinic Visit Note    History of present illness:  This is a 39 y.o. 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    She presented to the [facility] emergency department on 2/25/2025 for tachycardia.  She was at her PCP Dr. Rosenberg office for checkup when her pulse rate was found to be 140 bpm.  She was largely asymptomatic and workup in the ER was normal.  ER physician contacted attending cardiologist Dr. Stout who recommended increasing her metoprolol to 100 mg daily.  She was discharged home with outpatient cardiology follow-up.  She was noted to have a abscess in right lower abdomen so she was given a prescription for Bactrim.    Status post ICD implantation 1999 due to syncope and seizure. She was noted to have a lead fracture in 2012 and therapies have turned off since then.  She had no complaints of syncope, seizure palpitations or shock from her device noted in 2014.     PMHx/PSHx: As above  Tobacco Denies, Alcohol Social, Caffeine use   1-2 can of pop / day, Drug use  Denies      ICD implant 6/4/1989  Generator revision 8/18/2006  Lead revision 8/30/2007 due to fracture  ICD explant to 2/19/2015 with leads capped and abandoned.    Subjective:  She denies chest pain, shortness of breath, leg edema, fever, chills, orthopnea, paroxysmal nocturnal dyspnea or syncope denies any palpitations or fast heart rates since the events that precipitated her ER visit.  She uses wheelchair because of imbalance and knee problems.      Active Issues and Current Diagnoses  Sinus tachycardia  DiGeorge syndrome with hypocalcemia  QTc prolongation  -EKG from emergency department 2/25/2025 shows sinus tachycardia with heart rate of 107 bpm and QTc 480 ms  -Chest x-ray 2/25/2025 negative for intrathoracic abnormality  -Laboratory  evaluation 2/25/2025 revealed mild hypocalcemia with a calcium level of 7.8 mg/dL.  TSH was 1.73.  Of note on laboratory work 7/31/2024 TSH was 0.02 and free thyroxine 2.42.  -Toprol- mg daily.  Calcium carbonate 500 mg twice daily    Essential hypertension  -Subclinical, not currently on any antihypertensives.    Assessment and Plan  -EKG today shows normal sinus rhythm with a QTc of 467 ms which is improved from her EKG from the ER.  She reports she was taking an antibiotic prior to February 25, 2025 because of a dental procedure but is unsure what it was.  Her calcium carbonate and vitamin D was ordered for twice a day but she is only taking it once a day so this might explain her hypercalcemia and QTc prolongation.  Her tacky arrhythmia may be associated with that.  Agree with increasing her metoprolol to 100 mg daily.  I instructed her to stop taking Bactrim because it does have QTc prolongation effects.  She states she will take her calcium and vitamin D supplement twice a day.  If she has recurrence of her symptoms while taking the proper dose of calcium and I will proceed with ambulatory cardiac monitoring to evaluate for presence of other tachyarrhythmia.  I encouraged her to abstain from alcohol use.     Return to Care:  6 months with basic metabolic panel.    Objective  Past Medical History  Past Medical History:   Diagnosis Date    Personal history of diseases of the skin and subcutaneous tissue 02/18/2022    History of dermatitis       Past Surgical History  Past Surgical History:   Procedure Laterality Date    OTHER SURGICAL HISTORY  07/15/2021    Heart surgery    OTHER SURGICAL HISTORY  07/15/2021    Knee surgery       Medications  Current Outpatient Medications   Medication Instructions    albuterol 0.63 mg, nebulization, Every 4 hours PRN    calcitriol (ROCALTROL) 0.5 mcg, oral, Daily    calcium carbonate-vitamin D3 500 mg-5 mcg (200 unit) tablet 1 tablet, oral, 2 times daily     clotrimazole-betamethasone (Lotrisone) cream 1 Application, Topical, 2 times daily    ergocalciferol (VITAMIN D-2) 1,250 mcg, oral, Once Weekly    fluticasone (Flonase) 50 mcg/actuation nasal spray 2 sprays, Each Nostril, Daily    ibuprofen 600 mg, Every 6 hours PRN    levothyroxine (Synthroid, Unithroid) 300 mcg tablet She will need to take Synthroid 300 mcg with 75 mcg at the same time every day    magnesium chloride (MAG 64) 64 mg, oral, Daily    metoprolol succinate XL (TOPROL-XL) 100 mg, oral, Daily, Do not crush or chew.    potassium chloride ER (Micro-K) 10 mEq ER capsule 10 mEq, oral, Daily, Do not crush or chew.    sulfamethoxazole-trimethoprim (Bactrim DS) 800-160 mg tablet 1 tablet, oral, Every 12 hours       Allergies  Allergies   Allergen Reactions    Escitalopram Nausea And Vomiting    Oxycodone-Acetaminophen Nausea And Vomiting    Penicillins Hives       Social History  Social History     Tobacco Use    Smoking status: Never     Passive exposure: Never    Smokeless tobacco: Never   Vaping Use    Vaping status: Never Used   Substance Use Topics    Alcohol use: Yes     Comment: occasional    Drug use: Never       Family History  Family History   Problem Relation Name Age of Onset    No Known Problems Mother      No Known Problems Father      Breast cancer Cousin         VITALS  Vitals:    02/28/25 1322   BP: 106/66   Pulse: 78   SpO2: 99%       Weight  There were no vitals filed for this visit.    PHYSICAL EXAM  Physical Exam  Vitals and nursing note reviewed.   Constitutional:       General: She is not in acute distress.     Appearance: She is obese.   HENT:      Head: Normocephalic and atraumatic.      Mouth/Throat:      Mouth: Mucous membranes are moist.      Pharynx: Oropharynx is clear.   Eyes:      General: No scleral icterus.     Pupils: Pupils are equal, round, and reactive to light.   Cardiovascular:      Rate and Rhythm: Normal rate and regular rhythm.      Pulses: Normal pulses.      Heart  sounds: Normal heart sounds, S1 normal and S2 normal. No murmur heard.     No friction rub.   Pulmonary:      Effort: Pulmonary effort is normal.      Breath sounds: Normal breath sounds.   Abdominal:      General: Bowel sounds are normal. There is no distension.      Palpations: Abdomen is soft.      Tenderness: There is no abdominal tenderness.   Musculoskeletal:         General: Normal range of motion.      Cervical back: Normal range of motion and neck supple.      Right lower leg: No edema.      Left lower leg: No edema.   Skin:     General: Skin is warm and dry.      Capillary Refill: Capillary refill takes less than 2 seconds.      Findings: No rash.   Neurological:      General: No focal deficit present.      Mental Status: She is alert.   Psychiatric:         Mood and Affect: Mood normal.         Behavior: Behavior normal.         Cardiovascular Labs  Lab Results   Component Value Date    HGB 14.5 02/25/2025    HGB 12.9 12/06/2023    HGB 12.2 04/28/2023    HGB 11.7 (L) 07/15/2021     02/25/2025    WBC 10.2 02/25/2025     02/25/2025    K 3.6 02/25/2025    CREATININE 0.93 02/25/2025    CREATININE 0.91 07/31/2024    CREATININE 1.00 12/06/2023    BUN 15 02/25/2025    CALCIUM 7.8 (L) 02/25/2025    TROPHS <3 02/25/2025    TROPHS <3 02/25/2025       Echocardiogram  Results for orders placed in visit on 07/26/23    Echocardiogram    [place] [address]  Phone [phone], Fax [phone]    TRANSTHORACIC ECHOCARDIOGRAM REPORT      Patient Name:     RICARDO READ Reading Physician:  [id number] Nathaniel Santos MD  Study Date:       7/26/2023         Referring           OSKAR WARD MD  Physician:  MRN/PID:          [id number]          PCP:  Accession/Order#: [id number]         Department          [facility] Echo Lab  Location:  YOB: 1985         Fellow:  Gender:           F                 Nurse:              Brie Kaplan  RN  Admit Date:                         Sonographer:        Edu Forrester [facility]  Admission Status: Outpatient        Additional Staff:  Height:           154.94 cm         CC Report to:  Weight:           92.08 kg          Study Type:         Echocardiogram  BSA:              1.90 m2  Blood Pressure: 111 /65 mmHg    Diagnosis/ICD: I10-Essential (primary) hypertension  Indication:  Procedure/CPT: Echo Complete w Full [id number]  Study Detail: The following Echo studies were performed: 2D, M-Mode, Doppler and  color flow. Definity used as a contrast agent for endocardial  border definition and agitated saline used as a contrast agent for  intraseptal flow evaluation. Total contrast used for this  procedure was 1.50cc mL via IV push.      PHYSICIAN INTERPRETATION:  Left Ventricle: Left ventricular systolic function is normal, with an estimated ejection fraction of 55%. There are no regional wall motion abnormalities. The left ventricular cavity size is normal. Spectral Doppler shows a pseudonormal pattern of left ventricular diastolic filling.  Left Atrium: The left atrium is normal in size. A bubble study using agitated saline was performed. Bubble study is negative.  Right Ventricle: The right ventricle is normal in size. There is normal right ventricular global systolic function. Pacemaker/defibrillator wire visualized in the right ventricle.  Right Atrium: The right atrium is normal in size.  Aortic Valve: The aortic valve is trileaflet. There is no evidence of aortic valve regurgitation. The peak instantaneous gradient of the aortic valve is 6.8 mmHg. The mean gradient of the aortic valve is 4.0 mmHg.  Mitral Valve: The mitral valve is normal in structure. There is trace mitral valve regurgitation.  Tricuspid Valve: The tricuspid valve is structurally normal. No evidence of tricuspid regurgitation.  Pulmonic Valve: The pulmonic valve is not well visualized. There is no indication of pulmonic valve  regurgitation.  Pericardium: There is no pericardial effusion noted.  Aorta: The aortic root is normal.      CONCLUSIONS:  1. Left ventricular systolic function is normal with a 55% estimated ejection fraction.  2. Spectral Doppler shows a pseudonormal pattern of left ventricular diastolic filling.  3. Pacemaker/defibrillator wire visualized in the right ventricle.    QUANTITATIVE DATA SUMMARY:  2D MEASUREMENTS:  Normal Ranges:  Ao Root d:     3.10 cm   (2.0-3.7cm)  LAs:           3.80 cm   (2.7-4.0cm)  IVSd:          1.27 cm   (0.6-1.1cm)  LVPWd:         1.13 cm   (0.6-1.1cm)  LVIDd:         4.38 cm   (3.9-5.9cm)  LVIDs:         3.18 cm  LV Mass Index: 99.9 g/m2  LV % FS        27.4 %    LA VOLUME:  Normal Ranges:  LA Vol A4C:        20.3 ml    (22+/-6mL/m2)  LA Vol A2C:        46.8 ml  LA Vol BP:         32.8 ml  LA Vol Index A4C:  10.7ml/m2  LA Vol Index A2C:  24.6 ml/m2  LA Vol Index BP:   17.2 ml/m2  LA Area A4C:       10.4 cm2  LA Area A2C:       16.8 cm2  LA Major Axis A4C: 4.5 cm  LA Major Axis A2C: 5.1 cm  LA Volume Index:   10.7 ml/m2  LA Vol A4C:        20.3 ml  LA Vol A2C:        45.9 ml    LV SYSTOLIC FUNCTION BY 2D PLANIMETRY (MOD):  Normal Ranges:  EF-A4C View: 48.7 % (>=55%)  EF-A2C View: 59.8 %  EF-Biplane:  54.8 %    LV DIASTOLIC FUNCTION:  Normal Ranges:  MV Peak E:    0.94 m/s (0.7-1.2 m/s)  MV Peak A:    0.90 m/s (0.42-0.7 m/s)  E/A Ratio:    1.04     (1.0-2.2)  MV lateral e' 0.10 m/s  MV medial e'  0.06 m/s    MITRAL VALVE:  Normal Ranges:  MV DT: 190 msec (150-240msec)    AORTIC VALVE:  Normal Ranges:  AoV Vmax:                1.30 m/s (<=1.7m/s)  AoV Peak P.8 mmHg (<20mmHg)  AoV Mean P.0 mmHg (1.7-11.5mmHg)  LVOT Max Fred:            0.80 m/s (<=1.1m/s)  AoV VTI:                 27.70 cm (18-25cm)  LVOT VTI:                17.60 cm  LVOT Diameter:           2.00 cm  (1.8-2.4cm)  AoV Area, VTI:           2.00 cm2 (2.5-5.5cm2)  AoV Area,Vmax:           1.94 cm2  (2.5-4.5cm2)  AoV Dimensionless Index: 0.64      RIGHT VENTRICLE:  RV Basal 3.41 cm  RV Mid   2.35 cm  RV Major 6.7 cm  TAPSE:   19.5 mm  RV s'    0.14 m/s    PULMONIC VALVE:  Normal Ranges:  PV Max Fred: 0.7 m/s  (0.6-0.9m/s)  PV Max P.8 mmHg      [id number] Nathaniel Santos MD  Electronically signed on 2023 at 9:56:22 AM         Final        The ASCVD Risk score (Lissy GRIER, et al., 2019) failed to calculate for the following reasons:    Cannot find a previous HDL lab    Cannot find a previous total cholesterol lab  Low Risk: <5%  Borderline Risk: 5%-7.4%  Intermediate Risk: 7.5% - 19.9%  High Risk: >20%    If your symptoms worsen or progress please go directory to your nearest emergency department for evaluation.     Thank you for this interesting clinical case and allowing me to participate in the care of this patient. Please reach me out if you have any questions or if you need any clarifications regarding this patient's care.    **Disclaimer: This note was dictated by speech recognition, and every effort has been made to prevent any error in transcription, however minor errors may be present**  ___________________________________________________  Elmo Chaney, MSN, CNP, ACNPC, CCRN  Advanced Practice Provider, Nurse Practitioner  Division of Cardiovascular Medicine  [facility]  [facility]

## 2025-04-14 RX ORDER — LEVOTHYROXINE SODIUM 300 UG/1
TABLET ORAL
Qty: 90 TABLET | Refills: 1 | OUTPATIENT
Start: 2025-04-14

## 2025-06-25 ENCOUNTER — APPOINTMENT (OUTPATIENT)
Age: 40
End: 2025-06-25
Payer: COMMERCIAL

## 2025-08-01 ENCOUNTER — APPOINTMENT (OUTPATIENT)
Dept: CARDIOLOGY | Facility: CLINIC | Age: 40
End: 2025-08-01
Payer: COMMERCIAL

## 2025-08-11 ENCOUNTER — APPOINTMENT (OUTPATIENT)
Age: 40
End: 2025-08-11
Payer: COMMERCIAL

## 2025-08-12 ENCOUNTER — APPOINTMENT (OUTPATIENT)
Age: 40
End: 2025-08-12
Payer: COMMERCIAL

## 2025-08-12 VITALS
SYSTOLIC BLOOD PRESSURE: 138 MMHG | OXYGEN SATURATION: 98 % | WEIGHT: 214.6 LBS | DIASTOLIC BLOOD PRESSURE: 86 MMHG | HEIGHT: 61 IN | BODY MASS INDEX: 40.52 KG/M2 | HEART RATE: 96 BPM

## 2025-08-12 DIAGNOSIS — L73.9 FOLLICULITIS: Primary | ICD-10-CM

## 2025-08-12 DIAGNOSIS — Z12.31 ENCOUNTER FOR SCREENING MAMMOGRAM FOR MALIGNANT NEOPLASM OF BREAST: ICD-10-CM

## 2025-08-12 PROCEDURE — 3008F BODY MASS INDEX DOCD: CPT | Performed by: INTERNAL MEDICINE

## 2025-08-12 PROCEDURE — 1036F TOBACCO NON-USER: CPT | Performed by: INTERNAL MEDICINE

## 2025-08-12 PROCEDURE — 99213 OFFICE O/P EST LOW 20 MIN: CPT | Performed by: INTERNAL MEDICINE

## 2025-08-12 PROCEDURE — 3079F DIAST BP 80-89 MM HG: CPT | Performed by: INTERNAL MEDICINE

## 2025-08-12 PROCEDURE — 3075F SYST BP GE 130 - 139MM HG: CPT | Performed by: INTERNAL MEDICINE

## 2025-08-12 RX ORDER — DOXYCYCLINE 100 MG/1
100 CAPSULE ORAL 2 TIMES DAILY
Qty: 20 CAPSULE | Refills: 0 | Status: SHIPPED | OUTPATIENT
Start: 2025-08-12 | End: 2025-08-22

## 2025-08-12 RX ORDER — DOXYCYCLINE 100 MG/1
100 CAPSULE ORAL 2 TIMES DAILY
Qty: 28 CAPSULE | Refills: 0 | Status: SHIPPED | OUTPATIENT
Start: 2025-08-12 | End: 2025-08-12

## 2025-08-12 ASSESSMENT — PATIENT HEALTH QUESTIONNAIRE - PHQ9
2. FEELING DOWN, DEPRESSED OR HOPELESS: NOT AT ALL
1. LITTLE INTEREST OR PLEASURE IN DOING THINGS: NOT AT ALL
SUM OF ALL RESPONSES TO PHQ9 QUESTIONS 1 AND 2: 0

## 2025-08-15 ENCOUNTER — TELEPHONE (OUTPATIENT)
Age: 40
End: 2025-08-15
Payer: COMMERCIAL

## 2025-08-25 ENCOUNTER — APPOINTMENT (OUTPATIENT)
Age: 40
End: 2025-08-25
Payer: COMMERCIAL

## 2025-08-25 VITALS
HEART RATE: 75 BPM | HEIGHT: 60 IN | WEIGHT: 210.2 LBS | BODY MASS INDEX: 41.27 KG/M2 | DIASTOLIC BLOOD PRESSURE: 64 MMHG | SYSTOLIC BLOOD PRESSURE: 104 MMHG | OXYGEN SATURATION: 98 %

## 2025-08-25 DIAGNOSIS — Z12.31 ENCOUNTER FOR SCREENING MAMMOGRAM FOR MALIGNANT NEOPLASM OF BREAST: Primary | ICD-10-CM

## 2025-08-25 DIAGNOSIS — E61.2 MAGNESIUM DEFICIENCY: ICD-10-CM

## 2025-08-25 DIAGNOSIS — I10 HTN (HYPERTENSION), BENIGN: ICD-10-CM

## 2025-08-25 DIAGNOSIS — E03.8 HYPOTHYROIDISM, SECONDARY: ICD-10-CM

## 2025-08-25 DIAGNOSIS — E20.9 HYPOPARATHYROIDISM, UNSPECIFIED HYPOPARATHYROIDISM TYPE (MULTI): ICD-10-CM

## 2025-08-25 DIAGNOSIS — E55.9 VITAMIN D DEFICIENCY: ICD-10-CM

## 2025-08-25 DIAGNOSIS — E66.813 CLASS 3 SEVERE OBESITY DUE TO EXCESS CALORIES WITH SERIOUS COMORBIDITY AND BODY MASS INDEX (BMI) OF 40.0 TO 44.9 IN ADULT: ICD-10-CM

## 2025-08-25 DIAGNOSIS — Z00.00 ENCOUNTER FOR GENERAL ADULT MEDICAL EXAMINATION WITHOUT ABNORMAL FINDINGS: ICD-10-CM

## 2025-08-25 PROBLEM — E66.812 CLASS 2 OBESITY DUE TO EXCESS CALORIES WITHOUT SERIOUS COMORBIDITY WITH BODY MASS INDEX (BMI) OF 39.0 TO 39.9 IN ADULT: Status: RESOLVED | Noted: 2023-04-13 | Resolved: 2025-08-25

## 2025-08-25 PROBLEM — L73.9 FOLLICULITIS: Status: RESOLVED | Noted: 2025-02-25 | Resolved: 2025-08-25

## 2025-08-25 PROBLEM — R53.83 FATIGUE: Status: RESOLVED | Noted: 2023-04-12 | Resolved: 2025-08-25

## 2025-08-25 PROBLEM — E66.09 CLASS 2 OBESITY DUE TO EXCESS CALORIES WITHOUT SERIOUS COMORBIDITY WITH BODY MASS INDEX (BMI) OF 39.0 TO 39.9 IN ADULT: Status: RESOLVED | Noted: 2023-04-13 | Resolved: 2025-08-25

## 2025-08-25 PROBLEM — N63.20 MASS OF LEFT BREAST: Status: RESOLVED | Noted: 2024-07-23 | Resolved: 2025-08-25

## 2025-08-25 PROCEDURE — 1036F TOBACCO NON-USER: CPT | Performed by: INTERNAL MEDICINE

## 2025-08-25 PROCEDURE — 99213 OFFICE O/P EST LOW 20 MIN: CPT | Performed by: INTERNAL MEDICINE

## 2025-08-25 PROCEDURE — 3008F BODY MASS INDEX DOCD: CPT | Performed by: INTERNAL MEDICINE

## 2025-08-25 PROCEDURE — 3074F SYST BP LT 130 MM HG: CPT | Performed by: INTERNAL MEDICINE

## 2025-08-25 PROCEDURE — 3078F DIAST BP <80 MM HG: CPT | Performed by: INTERNAL MEDICINE

## 2025-08-25 RX ORDER — CALCITRIOL 0.5 UG/1
0.5 CAPSULE ORAL DAILY
Qty: 100 CAPSULE | Refills: 1 | Status: SHIPPED | OUTPATIENT
Start: 2025-08-25

## 2025-08-25 RX ORDER — POTASSIUM CHLORIDE 750 MG/1
10 CAPSULE, EXTENDED RELEASE ORAL DAILY
Qty: 100 CAPSULE | Refills: 1 | Status: SHIPPED | OUTPATIENT
Start: 2025-08-25

## 2025-08-25 RX ORDER — LEVOTHYROXINE SODIUM 300 UG/1
TABLET ORAL
Qty: 100 TABLET | Refills: 1 | Status: SHIPPED | OUTPATIENT
Start: 2025-08-25

## 2025-08-25 ASSESSMENT — PATIENT HEALTH QUESTIONNAIRE - PHQ9
1. LITTLE INTEREST OR PLEASURE IN DOING THINGS: NOT AT ALL
SUM OF ALL RESPONSES TO PHQ9 QUESTIONS 1 AND 2: 0
2. FEELING DOWN, DEPRESSED OR HOPELESS: NOT AT ALL

## 2025-08-28 ENCOUNTER — HOSPITAL ENCOUNTER (OUTPATIENT)
Dept: RADIOLOGY | Facility: CLINIC | Age: 40
Discharge: HOME | End: 2025-08-28
Payer: COMMERCIAL

## 2025-08-28 VITALS — HEIGHT: 60 IN | BODY MASS INDEX: 41.27 KG/M2 | WEIGHT: 210.2 LBS

## 2025-08-28 DIAGNOSIS — Z12.31 ENCOUNTER FOR SCREENING MAMMOGRAM FOR MALIGNANT NEOPLASM OF BREAST: ICD-10-CM

## 2025-08-28 PROCEDURE — 77067 SCR MAMMO BI INCL CAD: CPT | Performed by: RADIOLOGY

## 2025-08-28 PROCEDURE — 77067 SCR MAMMO BI INCL CAD: CPT

## 2025-08-28 PROCEDURE — 77063 BREAST TOMOSYNTHESIS BI: CPT | Performed by: RADIOLOGY

## 2025-08-29 ENCOUNTER — APPOINTMENT (OUTPATIENT)
Dept: CARDIOLOGY | Facility: CLINIC | Age: 40
End: 2025-08-29
Payer: COMMERCIAL

## 2025-09-05 ENCOUNTER — APPOINTMENT (OUTPATIENT)
Dept: CARDIOLOGY | Facility: CLINIC | Age: 40
End: 2025-09-05
Payer: COMMERCIAL

## 2025-09-05 PROBLEM — I45.81 ACQUIRED LONG QT SYNDROME: Status: ACTIVE | Noted: 2025-09-05

## 2025-12-05 ENCOUNTER — APPOINTMENT (OUTPATIENT)
Dept: CARDIOLOGY | Facility: CLINIC | Age: 40
End: 2025-12-05
Payer: COMMERCIAL

## 2026-02-23 ENCOUNTER — APPOINTMENT (OUTPATIENT)
Age: 41
End: 2026-02-23
Payer: COMMERCIAL